# Patient Record
Sex: MALE | Race: WHITE | NOT HISPANIC OR LATINO | Employment: FULL TIME | URBAN - NONMETROPOLITAN AREA
[De-identification: names, ages, dates, MRNs, and addresses within clinical notes are randomized per-mention and may not be internally consistent; named-entity substitution may affect disease eponyms.]

---

## 2018-08-11 ENCOUNTER — APPOINTMENT (OUTPATIENT)
Dept: CT IMAGING | Facility: HOSPITAL | Age: 33
End: 2018-08-11

## 2018-08-11 ENCOUNTER — HOSPITAL ENCOUNTER (EMERGENCY)
Facility: HOSPITAL | Age: 33
Discharge: HOME | End: 2018-08-11
Attending: EMERGENCY MEDICINE | Admitting: EMERGENCY MEDICINE

## 2018-08-11 ENCOUNTER — APPOINTMENT (OUTPATIENT)
Dept: GENERAL RADIOLOGY | Facility: HOSPITAL | Age: 33
End: 2018-08-11

## 2018-08-11 VITALS
HEART RATE: 74 BPM | RESPIRATION RATE: 18 BRPM | OXYGEN SATURATION: 98 % | DIASTOLIC BLOOD PRESSURE: 85 MMHG | WEIGHT: 170 LBS | TEMPERATURE: 98 F | HEIGHT: 64 IN | SYSTOLIC BLOOD PRESSURE: 131 MMHG | BODY MASS INDEX: 29.02 KG/M2

## 2018-08-11 DIAGNOSIS — S40.011A CONTUSION OF RIGHT SHOULDER, INITIAL ENCOUNTER: Primary | ICD-10-CM

## 2018-08-11 PROCEDURE — 70450 CT HEAD/BRAIN W/O DYE: CPT

## 2018-08-11 PROCEDURE — 99283 EMERGENCY DEPT VISIT LOW MDM: CPT

## 2018-08-11 PROCEDURE — 73030 X-RAY EXAM OF SHOULDER: CPT

## 2018-08-11 RX ORDER — IBUPROFEN 800 MG/1
800 TABLET ORAL EVERY 6 HOURS PRN
Qty: 20 TABLET | Refills: 0 | Status: SHIPPED | OUTPATIENT
Start: 2018-08-11 | End: 2018-08-13 | Stop reason: SDUPTHER

## 2018-08-11 RX ORDER — OXYCODONE HYDROCHLORIDE AND ACETAMINOPHEN 5; 325 MG/1; MG/1
1 TABLET ORAL EVERY 6 HOURS PRN
Qty: 15 TABLET | Refills: 0 | Status: SHIPPED | OUTPATIENT
Start: 2018-08-11 | End: 2019-03-15

## 2018-08-11 RX ORDER — OXYCODONE HYDROCHLORIDE AND ACETAMINOPHEN 5; 325 MG/1; MG/1
2 TABLET ORAL ONCE
Status: COMPLETED | OUTPATIENT
Start: 2018-08-11 | End: 2018-08-11

## 2018-08-11 RX ADMIN — OXYCODONE HYDROCHLORIDE AND ACETAMINOPHEN 2 TABLET: 5; 325 TABLET ORAL at 02:30

## 2018-08-11 NOTE — ED PROVIDER NOTES
Subjective   33-year-old male presents emergency Department with a right shoulder injury.  Patient was thrown from a horse about 6 hours ago.  He is riding horses 3-year-old son and both were knocked to the ground.  He landed on his right shoulder.  He did not hit his head and there was no loss of consciousness.  He said it felt like there was grinding in the shoulder.  Says the pain is in the upper posterior aspect of the shoulder.        Upper Extremity Issue   Location:  Shoulder  Shoulder location:  R shoulder  Injury: yes    Time since incident:  6 hours  Mechanism of injury: fall    Fall:     Fall occurred: off of a horse.    Impact surface:  Grass    Point of impact: R shoulder.  Pain details:     Quality:  Aching    Radiates to:  Does not radiate    Severity:  Severe    Onset quality:  Sudden    Timing:  Constant    Progression:  Worsening  Relieved by:  Nothing  Worsened by:  Movement  Ineffective treatments:  None tried  Associated symptoms: no back pain, no fatigue and no fever        Review of Systems   Constitutional: Negative for chills, fatigue and fever.   HENT: Negative for congestion and sore throat.    Eyes: Negative for visual disturbance.   Respiratory: Negative for cough and shortness of breath.    Cardiovascular: Negative for chest pain and leg swelling.   Gastrointestinal: Negative for abdominal pain, nausea and vomiting.   Genitourinary: Negative for dysuria.   Musculoskeletal: Negative for back pain.   Skin: Negative for rash.   Neurological: Negative for dizziness and weakness.   Psychiatric/Behavioral: Negative for behavioral problems.   All other systems reviewed and are negative.      Past Medical History:   Diagnosis Date   • Depression        No Known Allergies    History reviewed. No pertinent surgical history.    Family History   Problem Relation Age of Onset   • No Known Problems Mother    • Hypertension Father        Social History     Social History   • Marital status: Single      Social History Main Topics   • Smoking status: Never Smoker   • Smokeless tobacco: Never Used   • Alcohol use Yes      Comment: on weekends    • Drug use: No     Other Topics Concern   • Not on file           Objective   Physical Exam   Constitutional: He is oriented to person, place, and time. He appears well-developed and well-nourished.   HENT:   Head: Normocephalic and atraumatic.   Eyes: Pupils are equal, round, and reactive to light. EOM are normal.   Neck: Normal range of motion. Neck supple.   No midline tenderness   Cardiovascular: Normal rate, regular rhythm, normal heart sounds and intact distal pulses.  Exam reveals no gallop and no friction rub.    No murmur heard.  Pulmonary/Chest: Breath sounds normal. No respiratory distress. He has no wheezes. He has no rales.   No rhonchi   Abdominal: Soft. Bowel sounds are normal. He exhibits no distension. There is no tenderness.   Musculoskeletal: He exhibits tenderness. He exhibits no deformity.   There is marked tenderness to the shoulder without obvious deformity.  Ears good neurovascular function in the right upper extremity.   Neurological: He is alert and oriented to person, place, and time. No cranial nerve deficit. He exhibits normal muscle tone. Coordination normal.   Skin: Skin is warm and dry. No rash noted.   Psychiatric: He has a normal mood and affect. His behavior is normal.       Procedures           ED Course                  MDM      Final diagnoses:   Contusion of right shoulder, initial encounter            Percy Christine MD  08/11/18 [id number]

## 2018-08-13 ENCOUNTER — OFFICE VISIT (OUTPATIENT)
Dept: FAMILY MEDICINE CLINIC | Facility: CLINIC | Age: 33
End: 2018-08-13

## 2018-08-13 VITALS
WEIGHT: 174 LBS | OXYGEN SATURATION: 99 % | TEMPERATURE: 99.3 F | HEART RATE: 87 BPM | RESPIRATION RATE: 18 BRPM | HEIGHT: 64 IN | SYSTOLIC BLOOD PRESSURE: 110 MMHG | DIASTOLIC BLOOD PRESSURE: 60 MMHG | BODY MASS INDEX: 29.71 KG/M2

## 2018-08-13 DIAGNOSIS — S49.91XD INJURY OF RIGHT SHOULDER, SUBSEQUENT ENCOUNTER: ICD-10-CM

## 2018-08-13 DIAGNOSIS — F32.A DEPRESSION, UNSPECIFIED DEPRESSION TYPE: ICD-10-CM
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
## 2018-08-13 DIAGNOSIS — R11.0 NAUSEA: ICD-10-CM

## 2018-08-13 DIAGNOSIS — F41.9 ANXIETY: ICD-10-CM

## 2018-08-13 DIAGNOSIS — M25.561 ACUTE PAIN OF RIGHT KNEE: ICD-10-CM

## 2018-08-13 DIAGNOSIS — M25.511 ACUTE PAIN OF RIGHT SHOULDER: ICD-10-CM

## 2018-08-13 DIAGNOSIS — S89.91XD INJURY OF RIGHT KNEE, SUBSEQUENT ENCOUNTER: ICD-10-CM

## 2018-08-13 PROCEDURE — 96372 THER/PROPH/DIAG INJ SC/IM: CPT | Performed by: NURSE PRACTITIONER

## 2018-08-13 PROCEDURE — 99214 OFFICE O/P EST MOD 30 MIN: CPT | Performed by: NURSE PRACTITIONER

## 2018-08-13 RX ORDER — AMITRIPTYLINE HYDROCHLORIDE 25 MG/1
25 TABLET, FILM COATED ORAL NIGHTLY
Qty: 30 TABLET | Refills: 0 | Status: SHIPPED | OUTPATIENT
Start: 2018-08-13 | End: 2018-08-22

## 2018-08-13 RX ORDER — TRIAMCINOLONE ACETONIDE 40 MG/ML
40 INJECTION, SUSPENSION INTRA-ARTICULAR; INTRAMUSCULAR ONCE
Status: COMPLETED | OUTPATIENT
Start: 2018-08-13 | End: 2018-08-13

## 2018-08-13 RX ORDER — SERTRALINE HYDROCHLORIDE 100 MG/1
100 TABLET, FILM COATED ORAL DAILY
Qty: 30 TABLET | Refills: 6 | Status: SHIPPED | OUTPATIENT
Start: 2018-08-13 | End: 2019-03-15

## 2018-08-13 RX ORDER — TIZANIDINE 4 MG/1
4 TABLET ORAL 3 TIMES DAILY PRN
Qty: 90 TABLET | Refills: 0 | Status: SHIPPED | OUTPATIENT
Start: 2018-08-13 | End: 2018-08-22 | Stop reason: SDUPTHER

## 2018-08-13 RX ORDER — KETOROLAC TROMETHAMINE 30 MG/ML
60 INJECTION, SOLUTION INTRAMUSCULAR; INTRAVENOUS ONCE
Status: COMPLETED | OUTPATIENT
Start: 2018-08-13 | End: 2018-08-13

## 2018-08-13 RX ORDER — IBUPROFEN 800 MG/1
800 TABLET ORAL EVERY 8 HOURS PRN
Qty: 90 TABLET | Refills: 0 | Status: SHIPPED | OUTPATIENT
Start: 2018-08-13 | End: 2018-08-22 | Stop reason: SDUPTHER

## 2018-08-13 RX ADMIN — TRIAMCINOLONE ACETONIDE 40 MG: 40 INJECTION, SUSPENSION INTRA-ARTICULAR; INTRAMUSCULAR at 16:32

## 2018-08-13 RX ADMIN — KETOROLAC TROMETHAMINE 60 MG: 30 INJECTION, SOLUTION INTRAMUSCULAR; INTRAVENOUS at 16:31

## 2018-08-14 RX ORDER — PROMETHAZINE HYDROCHLORIDE 12.5 MG/1
12.5 TABLET ORAL EVERY 6 HOURS PRN
Qty: 30 TABLET | Refills: 0 | Status: SHIPPED | OUTPATIENT
Start: 2018-08-14 | End: 2018-08-22 | Stop reason: SDUPTHER

## 2018-08-14 NOTE — PROGRESS NOTES
Subjective   Ba Snow is a 33 y.o. male who presents to the office for right shoulder injury/pain secondary to falling off a horse.    History of Present Illness     Patient states incident occurred on 8/10/18.  However, his son was involved, so they took him to [place] and patient waited to go to the doctor still afterwards.  Patient's MRI and worse when they were thrown off, patient states that he was holding son in his right arm and landed on top of his right arm/shoulder and right side, is unsure pointed impact besides his right arm last shoulder and right side, but did have some residual pain the next day in his right knee as well as significant pain in his right arm/shoulder , and 2 hours after incident.  Patient did go to the ER at [facility] in [place] on 8/11/18.  Early in the a.m.  He states initially he had a grinding of the right shoulder and a couple hours later significant pain, states swelling and bruising has gone down, but pain has not.  It is remaining the same.  Patient unable to move from the right elbow up to the right shoulder without significant pain.  Patient states that he was put off work until today by the ER.  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    Reviewed ER report through [facility] in [place] on 8/11/18: Patient had CT of head without contrast and x-ray of right shoulder, both of which were negative.  Patient was prescribed Percocet and ibuprofen due to told to follow-up with PCP.  Patient also given sling for arm.    Anxiety/depression-chronic, controlled with Zoloft.  -Patient has been office due to not finding provider for refills, DT did well on it, agreeable to going back on it.    The following portions of the patient's history were reviewed and updated as appropriate: allergies, current medications, past family history, past medical history, past social history, past surgical history and problem list.    Review of Systems  "  Constitutional: Positive for activity change (cannot use right arm at all). Negative for appetite change, chills, diaphoresis, fatigue, fever and unexpected weight change.   HENT: Negative for congestion, ear discharge, ear pain, hearing loss, postnasal drip, rhinorrhea, sinus pain, sinus pressure, sneezing, sore throat, tinnitus and trouble swallowing.    Eyes: Negative.    Respiratory: Negative for cough, chest tightness, shortness of breath and wheezing.    Cardiovascular: Negative for chest pain, palpitations and leg swelling.   Gastrointestinal: Positive for nausea (only with excruiating pain). Negative for abdominal distention, abdominal pain, blood in stool, constipation, diarrhea and vomiting.   Genitourinary: Negative for decreased urine volume, discharge, dysuria, flank pain, frequency, hematuria, penile pain, penile swelling, scrotal swelling, testicular pain and urgency.   Musculoskeletal: Positive for arthralgias (right shoulder, right knee).   Neurological: Negative for dizziness, tremors, seizures, syncope, weakness, light-headedness, numbness and headaches.   Hematological: Does not bruise/bleed easily.   Psychiatric/Behavioral: Negative for agitation, behavioral problems, decreased concentration, self-injury, sleep disturbance and suicidal ideas. The patient is not nervous/anxious.        Past Medical History:   Diagnosis Date   • Depression        Family History   Problem Relation Age of Onset   • No Known Problems Mother    • Hypertension Father           Objective   /60   Pulse 87   Temp 99.3 °F (37.4 °C) (Temporal Artery )   Resp 18   Ht 162.6 cm (64\")   Wt 78.9 kg (174 lb)   SpO2 99%   BMI 29.87 kg/m²   Physical Exam   Constitutional: He appears well-developed and well-nourished. No distress.   Cardiovascular: Normal rate, regular rhythm, normal heart sounds and intact distal pulses.  Exam reveals no gallop and no friction rub.    No murmur heard.  Pulmonary/Chest: Effort normal " and breath sounds normal. No respiratory distress. He has no wheezes. He has no rales.   Musculoskeletal:        Right shoulder: He exhibits decreased range of motion (extremely limited ROM of shoulder and elbow), tenderness (anterior grove of inner anterior aspect of shoulder), pain and decreased strength (patient unable to lift weight of right arm). He exhibits no bony tenderness, no swelling, no deformity, no spasm and normal pulse.        Right knee: He exhibits normal range of motion and no swelling. Tenderness found. Patellar tendon tenderness noted. No medial joint line, no lateral joint line, no MCL and no LCL tenderness noted.   Skin: Skin is warm and dry. Capillary refill takes less than 2 seconds. He is not diaphoretic. No pallor.        Psychiatric: He has a normal mood and affect. His behavior is normal.   Nursing note and vitals reviewed.       PHQ-2/PHQ-9 Depression Screening 8/13/2018   Little interest or pleasure in doing things 0   Feeling down, depressed, or hopeless 0   Total Score 0         Assessment/Plan   Ba was seen today for follow-up.    Diagnoses and all orders for this visit:    Fall from horse, subsequent encounter    Anxiety  -     sertraline (ZOLOFT) 100 MG tablet; Take 1 tablet by mouth Daily.    Depression, unspecified depression type  -     sertraline (ZOLOFT) 100 MG tablet; Take 1 tablet by mouth Daily.    Acute pain of right shoulder  -     ibuprofen (ADVIL,MOTRIN) 800 MG tablet; Take 1 tablet by mouth Every 8 (Eight) Hours As Needed for Mild Pain .  -     tiZANidine (ZANAFLEX) 4 MG tablet; Take 1 tablet by mouth 3 (Three) Times a Day As Needed for Muscle Spasms.  -     amitriptyline (ELAVIL) 25 MG tablet; Take 1 tablet by mouth Every Night.  -     ketorolac (TORADOL) injection 60 mg; Inject 60 mg into the appropriate muscle as directed by prescriber 1 (One) Time.  -     triamcinolone acetonide (KENALOG-40) injection 40 mg; Inject 1 mL into the appropriate muscle as  directed by prescriber 1 (One) Time.  -     MRI Shoulder Right Without Contrast    Injury of right shoulder, subsequent encounter  -     MRI Shoulder Right Without Contrast    Injury of right knee, subsequent encounter    Acute pain of right knee    Nausea    Other orders  -     promethazine (PHENERGAN) 12.5 MG tablet; Take 1 tablet by mouth Every 6 (Six) Hours As Needed for Nausea or Vomiting.           Patient having right knee pain and right shoulder pain secondary to be thrown off a horse on 8/10/18. ER report through [facility] in [place] on 8/11/18 reviewed above. Patient had CT of head without contrast and x-ray of right shoulder, both of which were negative.  Physical exam showed that patient is unable to move elbow or shoulder without significant pain, unable to fully hold up arm with musculature of right shoulder, and needs support to fully hold up right arm.  I am concerned about partial is not full-thickness tear of the anterior shoulder muscle.  Will order MRI of right shoulder without contrast to rule in or out.  Patient declines offer for x-ray of right knee as he states this is getting better and not bothering him that much.  Can continue to alternate Percocets until they are out with ibuprofen, refilled ibuprofen, prescribed Zanaflex when necessary, added amitriptyline at bedtime.  Follow-up next week.  Off work until follow-up as patient work, does not allow him to work with restrictions. Provided new sling as sling provided previously covered in vomit from son and pt not currently using one. Advised pt to wear continuously to help keep from worsening condition of shoulder before MRI completed with ROM of as tolerated. Phenergan for nausea with pain gets really bad.     Anxiety/depression-chronic, controlled with Zoloft.  -Refilled Zoloft, follow-up in 6 months    Patient educated to follow-up sooner than next scheduled appointment if condition(s) worse or do not improve. Patient states  understanding and is in agreeance with plan of care. An After Visit Summary was printed and given to the patient.      VIJAYA Tenorio        This document has been electronically signed by VIJAYA Tenorio on August 14, 2018 8:36 AM      EMR/Transcription Dragon Disclaimer:  Some of this note may be an electronic dragon transcription/translation of spoken language to printed text. The electronic translation of spoken language may permit erroneous, or at times, nonsensical words or phrases to be inadvertently transcribed. Although I have reviewed the note for such errors, some may still exist.

## 2018-08-17 ENCOUNTER — TELEPHONE (OUTPATIENT)
Dept: FAMILY MEDICINE CLINIC | Facility: CLINIC | Age: 33
End: 2018-08-17

## 2018-08-17 DIAGNOSIS — R93.6 ABNORMAL MRI, SHOULDER: Primary | ICD-10-CM

## 2018-08-17 NOTE — TELEPHONE ENCOUNTER
----- Message from VIJAYA Tenorio sent at 8/16/2018  7:53 PM CDT -----  Let patient know MRI resulted, showing surprisingly no muscle tear but he fracture of big ball of shoulder bone (greater tuberosity) that is mildly displaced.     Some Osteoarthritis changes of joint probably just from overuse.     Some tendinosis of two areas. Tendinosis is damage to a tendon at a cellular level. It is thought to be caused by microtears in the connective tissue in and around the tendon, leading to an increase in tendon repair cells. This may lead to reduced tensile strength, thus increasing the chance of tendon rupture.     And inflammation of one of the brusa sacs of the joint.    Plan for this: DO NOT LIFT ANYTHING MORE THAN FIVE lbs with that arm. And we need to get you into ortho asap for estimation of recovery time and if surgery is needed. Please schedule stat appt with ortho, tell pt to  disc for appt. Let me knwo when appt is thanks :)

## 2018-08-17 NOTE — TELEPHONE ENCOUNTER
----- Message from Betsy Jordan sent at 8/17/2018  2:13 PM CDT -----  Appointment with Dr Denise on 8/20/18 @10:15. He will need to get MRI on a disk for that appointment. I spoke with him in regards to the results and- Michael his girlfriend with the appointment time and instruction on getting the disk.  ----- Message -----  From: Kelsey Sanchez APRN  Sent: 8/16/2018   7:53 PM  To: Betsy Jordan    Let patient know MRI resulted, showing surprisingly no muscle tear but he fracture of big ball of shoulder bone (greater tuberosity) that is mildly displaced.     Some Osteoarthritis changes of joint probably just from overuse.     Some tendinosis of two areas. Tendinosis is damage to a tendon at a cellular level. It is thought to be caused by microtears in the connective tissue in and around the tendon, leading to an increase in tendon repair cells. This may lead to reduced tensile strength, thus increasing the chance of tendon rupture.     And inflammation of one of the brusa sacs of the joint.    Plan for this: DO NOT LIFT ANYTHING MORE THAN FIVE lbs with that arm. And we need to get you into ortho asap for estimation of recovery time and if surgery is needed. Please schedule stat appt with ortho, tell pt to  disc for appt. Let me knwo when appt is thanks :)

## 2018-08-20 ENCOUNTER — OFFICE VISIT (OUTPATIENT)
Dept: ORTHOPEDIC SURGERY | Facility: CLINIC | Age: 33
End: 2018-08-20

## 2018-08-20 VITALS — HEIGHT: 64 IN | BODY MASS INDEX: 29.71 KG/M2 | WEIGHT: 174 LBS

## 2018-08-20 DIAGNOSIS — M25.511 ACUTE PAIN OF RIGHT SHOULDER: Primary | ICD-10-CM

## 2018-08-20 DIAGNOSIS — S42.254A NONDISPLACED FRACTURE OF GREATER TUBEROSITY OF RIGHT HUMERUS, INITIAL ENCOUNTER FOR CLOSED FRACTURE: ICD-10-CM

## 2018-08-20 PROCEDURE — 99203 OFFICE O/P NEW LOW 30 MIN: CPT | Performed by: ORTHOPAEDIC SURGERY

## 2018-08-20 PROCEDURE — 23620 CLTX GR HMRL TBRS FX WO MNPJ: CPT | Performed by: ORTHOPAEDIC SURGERY

## 2018-08-20 NOTE — PROGRESS NOTES
Ba Snow is a 33 y.o. male   Primary provider:  Provider, No Known       Chief Complaint   Patient presents with   • Right Shoulder - Pain       HISTORY OF PRESENT ILLNESS: Patient is here today for right shoulder pain. Patient has been seen in the ER and by VIJAYA Hsu for his right shoulder.  He was thrown off a horse and injured his right shoulder.  X-rays did show that had MRI scan which shows a nondisplaced fracture of the greater tuberosity.  No history of prior injury.  Associated with pain but no neurologic symptoms are noted.    History of Present Illness     CONCURRENT MEDICAL HISTORY:    Past Medical History:   Diagnosis Date   • Depression        No Known Allergies      Current Outpatient Prescriptions:   •  ibuprofen (ADVIL,MOTRIN) 800 MG tablet, Take 1 tablet by mouth Every 8 (Eight) Hours As Needed for Mild Pain ., Disp: 90 tablet, Rfl: 0  •  promethazine (PHENERGAN) 12.5 MG tablet, Take 1 tablet by mouth Every 6 (Six) Hours As Needed for Nausea or Vomiting., Disp: 30 tablet, Rfl: 0  •  sertraline (ZOLOFT) 100 MG tablet, Take 1 tablet by mouth Daily., Disp: 30 tablet, Rfl: 6  •  tiZANidine (ZANAFLEX) 4 MG tablet, Take 1 tablet by mouth 3 (Three) Times a Day As Needed for Muscle Spasms., Disp: 90 tablet, Rfl: 0  •  amitriptyline (ELAVIL) 25 MG tablet, Take 1 tablet by mouth Every Night., Disp: 30 tablet, Rfl: 0  •  oxyCODONE-acetaminophen (PERCOCET) 5-325 MG per tablet, Take 1 tablet by mouth Every 6 (Six) Hours As Needed for Severe Pain ., Disp: 15 tablet, Rfl: 0    No past surgical history on file.    Family History   Problem Relation Age of Onset   • No Known Problems Mother    • Hypertension Father         Social History     Social History   • Marital status: Single     Spouse name: N/A   • Number of children: N/A   • Years of education: N/A     Occupational History   • Not on file.     Social History Main Topics   • Smoking status: Never Smoker   • Smokeless tobacco: Never Used  "  • Alcohol use Yes      Comment: on weekends    • Drug use: No   • Sexual activity: Not on file     Other Topics Concern   • Not on file     Social History Narrative   • No narrative on file        Review of Systems   Constitutional: Positive for activity change. Negative for chills and fever.   HENT: Negative for facial swelling.    Eyes: Negative for photophobia.   Respiratory: Negative for apnea and shortness of breath.    Cardiovascular: Negative for chest pain and leg swelling.   Gastrointestinal: Negative for abdominal pain, nausea and vomiting.   Genitourinary: Negative for dysuria.   Musculoskeletal: Positive for arthralgias and joint swelling.   Skin: Negative for color change and rash.   Neurological: Negative for seizures and syncope.   Psychiatric/Behavioral: Negative for behavioral problems and dysphoric mood.       PHYSICAL EXAMINATION:       Ht 162.6 cm (64\")   Wt 78.9 kg (174 lb)   BMI 29.87 kg/m²     Physical Exam   Constitutional: He is oriented to person, place, and time. He appears well-developed and well-nourished.   HENT:   Head: Normocephalic and atraumatic.   Eyes: Pupils are equal, round, and reactive to light. EOM are normal.   Neck: Neck supple. No tracheal deviation present.   Pulmonary/Chest: Effort normal.   Musculoskeletal: Normal range of motion. He exhibits no edema or deformity.   Neurological: He is alert and oriented to person, place, and time.   Skin: Skin is warm and dry. No erythema.   Psychiatric: He has a normal mood and affect. Thought content normal.   Vitals reviewed.      GAIT:     [x]  Normal  []  Antalgic    Assistive device: [x]  None  []  Walker     []  Crutches  []  Cane     []  Wheelchair  []  Stretcher    Ortho Exam   Pain and tenderness pain with any root type of abduction passive motions intact he's neurologically intact.  Over the anterior shoulder.    Xr Shoulder 2+ View Right    Result Date: 8/11/2018  Narrative: Exam: Right shoulder three views INDICATION: " Pain FINDINGS: Three views. No acute fracture or dislocation. Joint spaces are maintained. No lytic or destructive lesion     Impression: No acute bony abnormality. Electronically signed by:  Ronn Ross MD  8/11/2018 2:30 AM CDT Workstation: [id number]    Ct Head Without Contrast    Result Date: 8/11/2018  Narrative: Exam: Head CT without contrast INDICATION: Trauma TECHNIQUE: Routine head CT without contrast. Sagittal coronal reconstructions were obtained. This exam was performed according to our departmental dose-optimization program, which includes automated exposure control, adjustment of the mA and/or kV according to patient size and/or use of iterative reconstruction technique. COMPARISON: 8/5/2012 FINDINGS: The bony calvarium is intact. Mild mucosal thickening is present in the ethmoid sinuses. Mastoid air cells are clear. No extra-axial fluid collection. The ventricular system is normal. Normal gray-white differentiation. No obvious sign of acute infarction. No intraparenchymal hemorrhage, mass or midline shift.     Impression: No acute intracranial abnormality. Electronically signed by:  Ronn Ross MD  8/11/2018 3:04 AM CDT Workstation: [id number]    X-ray shows nondisplaced greater tuberosity fracture.      ASSESSMENT:    Diagnoses and all orders for this visit:    Acute pain of right shoulder  -     Cancel: XR Shoulder 2+ View Right  -     XR shoulder 1 vw right    Nondisplaced fracture of greater tuberosity of right humerus, initial encounter for closed fracture          PLAN I've gone over an x-ray today and external rotation x-ray week actually see the fracture.  His previous x-rays he had only 2 interrupted rotators raise.  I've gone over this with him will do pendulum type exercises which I have instructed him.  He'll use a sling for comfort.  I told him to avoid active motion will see him in 3 weeks repeat an x-ray on arrival.  I suspect this will heal spontaneously.  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    No Follow-up on file.    Kush Lou MD

## 2018-08-22 ENCOUNTER — OFFICE VISIT (OUTPATIENT)
Dept: FAMILY MEDICINE CLINIC | Facility: CLINIC | Age: 33
End: 2018-08-22

## 2018-08-22 VITALS
OXYGEN SATURATION: 98 % | WEIGHT: 174 LBS | BODY MASS INDEX: 29.71 KG/M2 | HEIGHT: 64 IN | SYSTOLIC BLOOD PRESSURE: 114 MMHG | DIASTOLIC BLOOD PRESSURE: 74 MMHG | RESPIRATION RATE: 16 BRPM | HEART RATE: 91 BPM | TEMPERATURE: 99.2 F

## 2018-08-22 DIAGNOSIS — R07.89 RIGHT-SIDED CHEST WALL PAIN: ICD-10-CM

## 2018-08-22 DIAGNOSIS — S42.254A NONDISPLACED FRACTURE OF GREATER TUBEROSITY OF RIGHT HUMERUS, INITIAL ENCOUNTER FOR CLOSED FRACTURE: Primary | ICD-10-CM
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
## 2018-08-22 DIAGNOSIS — G47.00 INSOMNIA, UNSPECIFIED TYPE: ICD-10-CM

## 2018-08-22 DIAGNOSIS — M25.511 ACUTE PAIN OF RIGHT SHOULDER: ICD-10-CM

## 2018-08-22 DIAGNOSIS — M25.561 ACUTE PAIN OF RIGHT KNEE: ICD-10-CM

## 2018-08-22 PROCEDURE — 99214 OFFICE O/P EST MOD 30 MIN: CPT | Performed by: NURSE PRACTITIONER

## 2018-08-22 RX ORDER — TIZANIDINE 4 MG/1
4 TABLET ORAL 3 TIMES DAILY PRN
Qty: 90 TABLET | Refills: 2 | Status: SHIPPED | OUTPATIENT
Start: 2018-08-22 | End: 2019-03-15

## 2018-08-22 RX ORDER — PROMETHAZINE HYDROCHLORIDE 12.5 MG/1
12.5 TABLET ORAL EVERY 6 HOURS PRN
Qty: 30 TABLET | Refills: 2 | Status: SHIPPED | OUTPATIENT
Start: 2018-08-22 | End: 2019-03-28

## 2018-08-22 RX ORDER — IBUPROFEN 800 MG/1
800 TABLET ORAL EVERY 8 HOURS PRN
Qty: 90 TABLET | Refills: 2 | Status: SHIPPED | OUTPATIENT
Start: 2018-08-22 | End: 2019-03-28

## 2018-08-22 RX ORDER — QUETIAPINE FUMARATE 50 MG/1
TABLET, FILM COATED ORAL
Qty: 60 TABLET | Refills: 0 | Status: SHIPPED | OUTPATIENT
Start: 2018-08-22 | End: 2018-09-12 | Stop reason: SDUPTHER

## 2018-09-05 NOTE — PROGRESS NOTES
Subjective   Ba Snow is a 33 y.o. male who presents to the office for right shoulder injury/pain secondary to falling off a horse.    History of Present Illness     On 8/10/18, patient was thrown from a horse, along with his son.  Point of impact was right shoulder and right side.  Patient complaint of right shoulder/arm pain, right sided pain, and right knee pain.  Due to his son injuries being more concerning he did not go to the ER until 8/11/18, ER report per [facility] on [place] is summarized during office visit on 8/13/18.  See office visit 8/13/18 for full report on incident.    8/11/18: CT head without contrast: Impression: Negative  8/11/18: X-ray of right shoulder: Impression: Negative  8/16/18: MRI of right shoulder: Acute to subacute appearing mildly displaced and slightly communited fracture of greater tuberosity, mild infraspinatus and supraspinatus tendinosis, mild glenohumeral joint primary osteoarthritic changes, mild subacromial subdeltoid bursitis.    After reviewing MRI with patient via phone we set up a referral and appointment with Dr. Lou orthopedic specialist.  Patient saw Dr. Lou on 8/20/18, reviewed that MRI of right shoulder showed nondisplaced fracture of greater tuberosity.  Plan of care included pendulum type exercises, sling for comfort, avoid active motion, will see back in 3 weeks to repeat x-ray, will heal spontaneously, off work for 6 weeks.    Today 8/22/18, patient states still having a decent amount of pain of right shoulder, states it is worse at nighttime.  Patient is using sling and doing exercises as ordered by orthopedic specialist.  Patient has [facility] paperwork to fill out for an estimated 6 weeks off at work.  Pain of right side and right knee are improving but not completely resolved.    Patient states that anxiety and depression are well controlled with Zoloft that he is having trouble sleeping, even if pain is controlled.  Would  like to try Seroquel for sleep.  Advised patient that this does not work like amitriptyline is and it will not help with pain only sleep, patient understands would still like to try it.    PMH:  Anxiety/depression-chronic, controlled with Zoloft.    The following portions of the patient's history were reviewed and updated as appropriate: allergies, current medications, past family history, past medical history, past social history, past surgical history and problem list.    Review of Systems   Constitutional: Positive for activity change (cannot use right arm at all). Negative for appetite change, chills, diaphoresis, fatigue, fever and unexpected weight change.   HENT: Negative for congestion, ear discharge, ear pain, hearing loss, postnasal drip, rhinorrhea, sinus pain, sinus pressure, sneezing, sore throat, tinnitus and trouble swallowing.    Eyes: Negative.    Respiratory: Negative for cough, chest tightness, shortness of breath and wheezing.    Cardiovascular: Negative for chest pain, palpitations and leg swelling.   Gastrointestinal: Positive for nausea (only with excruiating pain). Negative for abdominal distention, abdominal pain, blood in stool, constipation, diarrhea and vomiting.   Genitourinary: Negative for decreased urine volume, discharge, dysuria, flank pain, frequency, hematuria, penile pain, penile swelling, scrotal swelling, testicular pain and urgency.   Musculoskeletal: Positive for arthralgias (right shoulder, right knee).   Neurological: Negative for dizziness, tremors, seizures, syncope, weakness, light-headedness, numbness and headaches.   Hematological: Does not bruise/bleed easily.   Psychiatric/Behavioral: Negative for agitation, behavioral problems, decreased concentration, self-injury, sleep disturbance and suicidal ideas. The patient is not nervous/anxious.        Past Medical History:   Diagnosis Date   • Depression        Family History   Problem Relation Age of Onset   • No Known  "Problems Mother    • Hypertension Father           Objective   /74   Pulse 91   Temp 99.2 °F (37.3 °C) (Temporal Artery )   Resp 16   Ht 162.6 cm (64\")   Wt 78.9 kg (174 lb)   SpO2 98%   BMI 29.87 kg/m²   Physical Exam   Constitutional: He appears well-developed and well-nourished. No distress.   Cardiovascular: Normal rate, regular rhythm, normal heart sounds and intact distal pulses.  Exam reveals no gallop and no friction rub.    No murmur heard.  Pulmonary/Chest: Effort normal and breath sounds normal. No respiratory distress. He has no wheezes. He has no rales.   Musculoskeletal:        Right shoulder: He exhibits decreased range of motion (extremely limited ROM of shoulder and elbow), tenderness (anterior grove of inner anterior aspect of shoulder), pain and decreased strength (patient unable to lift weight of right arm). He exhibits no bony tenderness, no swelling, no deformity, no spasm and normal pulse.        Right knee: He exhibits normal range of motion and no swelling. Tenderness found. Patellar tendon tenderness noted. No medial joint line, no lateral joint line, no MCL and no LCL tenderness noted.   Skin: Skin is warm and dry. Capillary refill takes less than 2 seconds. He is not diaphoretic. No pallor.        Psychiatric: He has a normal mood and affect. His behavior is normal.   Nursing note and vitals reviewed.       PHQ-2/PHQ-9 Depression Screening 8/22/2018   Little interest or pleasure in doing things 0   Feeling down, depressed, or hopeless 0   Total Score 0         Assessment/Plan   Ba was seen today for follow-up.    Diagnoses and all orders for this visit:    Nondisplaced fracture of greater tuberosity of right humerus, initial encounter for closed fracture    Insomnia, unspecified type  -     QUEtiapine (SEROQUEL) 50 MG tablet; Take 1-2 tablets at bedtime for sleep.    Acute pain of right shoulder  -     ibuprofen (ADVIL,MOTRIN) 800 MG tablet; Take 1 tablet by mouth Every " "8 (Eight) Hours As Needed for Mild Pain .  -     tiZANidine (ZANAFLEX) 4 MG tablet; Take 1 tablet by mouth 3 (Three) Times a Day As Needed for Muscle Spasms.    Fall from horse, sequela    Right-sided chest wall pain    Acute pain of right knee    Other orders  -     promethazine (PHENERGAN) 12.5 MG tablet; Take 1 tablet by mouth Every 6 (Six) Hours As Needed for Nausea or Vomiting.            Insomnia-new problem, trying they're \"left bedtime.  Will follow-up in a couple weeks,.    Patient having right knee pain, right side, and right shoulder pain secondary to be thrown off a horse on 8/10/18.  Nausea secondary to pain.  MRI of right shoulder showed nondisplaced fracture of greater tuberosity of right humerus.  Patient is being monitored by orthopedic specialist Dr. Hammond who estimates spontaneous healing over the next 6 weeks.  [facility] paperwork filled out.  Refilled Zanaflex and ibuprofen and promethazine for nausea.  Follow-up in a couple weeks after seeing ortho.    Patient educated to follow-up sooner than next scheduled appointment if condition(s) worse or do not improve. Patient states understanding and is in agreeance with plan of care. An After Visit Summary was printed and given to the patient.      VIJAYA Tenorio        This document has been electronically signed by VIJAYA Tenorio on September 5, 2018 12:59 PM      EMR/Transcription Dragon Disclaimer:  Some of this note may be an electronic dragon transcription/translation of spoken language to printed text. The electronic translation of spoken language may permit erroneous, or at times, nonsensical words or phrases to be inadvertently transcribed. Although I have reviewed the note for such errors, some may still exist.   "

## 2018-09-07 DIAGNOSIS — S42.254A NONDISPLACED FRACTURE OF GREATER TUBEROSITY OF RIGHT HUMERUS, INITIAL ENCOUNTER FOR CLOSED FRACTURE: Primary | ICD-10-CM

## 2018-09-10 ENCOUNTER — OFFICE VISIT (OUTPATIENT)
Dept: ORTHOPEDIC SURGERY | Facility: CLINIC | Age: 33
End: 2018-09-10

## 2018-09-10 VITALS — WEIGHT: 174 LBS | BODY MASS INDEX: 29.71 KG/M2 | HEIGHT: 64 IN

## 2018-09-10 DIAGNOSIS — M25.511 ACUTE PAIN OF RIGHT SHOULDER: ICD-10-CM

## 2018-09-10 DIAGNOSIS — S42.254A NONDISPLACED FRACTURE OF GREATER TUBEROSITY OF RIGHT HUMERUS, INITIAL ENCOUNTER FOR CLOSED FRACTURE: Primary | ICD-10-CM

## 2018-09-10 PROCEDURE — 99024 POSTOP FOLLOW-UP VISIT: CPT | Performed by: ORTHOPAEDIC SURGERY

## 2018-09-10 NOTE — PROGRESS NOTES
"Ba Snow is a 33 y.o. male returns for     Chief Complaint   Patient presents with   • Right Shoulder - Follow-up       HISTORY OF PRESENT ILLNESS: Patient is here today for recheck of right humerus fracture. Patient was sent to xray upon arrival.  3 weeks out still using his sling       CONCURRENT MEDICAL HISTORY:    The following portions of the patient's history were reviewed and updated as appropriate: allergies, current medications, past family history, past medical history, past social history, past surgical history and problem list.     ROS  No fevers or chills.  No chest pain or shortness of air.  No GI or  disturbances.    PHYSICAL EXAMINATION:       Ht 162.6 cm (64\")   Wt 78.9 kg (174 lb)   BMI 29.87 kg/m²     Physical Exam    GAIT:     []  Normal  []  Antalgic    Assistive device: []  None  []  Walker     []  Crutches  []  Cane     []  Wheelchair  []  Stretcher    Ortho Exam  Angular motion is intact.  Neurologically intact.    Xr Shoulder 1 Vw Right    Result Date: 8/26/2018  Narrative: One view right shoulder externally rotated Comparison previous scan Hospital Overall bone quality appears intact.  Minimally displaced greater tuberosity fracture.  Glenohumeral joint appears intact. Impression: Greater tuberosity fracture with minimal displacement acute            ASSESSMENT:    Diagnoses and all orders for this visit:    Nondisplaced fracture of greater tuberosity of right humerus, initial encounter for closed fracture  -     Ambulatory Referral to Physical Therapy Evaluate and treat; ROM (Gentle)    Acute pain of right shoulder  -     Ambulatory Referral to Physical Therapy Evaluate and treat; ROM (Gentle)          PLAN physical therapy for gentle active motion.  Follow-up in 3-4 weeks x-raying arrival.    No Follow-up on file.    Kush Lou MD  "

## 2018-09-12 ENCOUNTER — OFFICE VISIT (OUTPATIENT)
Dept: FAMILY MEDICINE CLINIC | Facility: CLINIC | Age: 33
End: 2018-09-12

## 2018-09-12 VITALS
HEART RATE: 92 BPM | WEIGHT: 180 LBS | HEIGHT: 64 IN | SYSTOLIC BLOOD PRESSURE: 110 MMHG | RESPIRATION RATE: 16 BRPM | TEMPERATURE: 99.7 F | DIASTOLIC BLOOD PRESSURE: 70 MMHG | OXYGEN SATURATION: 98 % | BODY MASS INDEX: 30.73 KG/M2

## 2018-09-12 DIAGNOSIS — B07.9 VIRAL WARTS, UNSPECIFIED TYPE: Primary | ICD-10-CM

## 2018-09-12 DIAGNOSIS — G47.00 INSOMNIA, UNSPECIFIED TYPE: ICD-10-CM

## 2018-09-12 DIAGNOSIS — S42.254A NONDISPLACED FRACTURE OF GREATER TUBEROSITY OF RIGHT HUMERUS, INITIAL ENCOUNTER FOR CLOSED FRACTURE: ICD-10-CM

## 2018-09-12 DIAGNOSIS — M25.511 ACUTE PAIN OF RIGHT SHOULDER: ICD-10-CM

## 2018-09-12 PROCEDURE — 99214 OFFICE O/P EST MOD 30 MIN: CPT | Performed by: NURSE PRACTITIONER

## 2018-09-12 PROCEDURE — 17110 DESTRUCTION B9 LES UP TO 14: CPT | Performed by: NURSE PRACTITIONER

## 2018-09-12 RX ORDER — QUETIAPINE FUMARATE 50 MG/1
TABLET, FILM COATED ORAL
Qty: 60 TABLET | Refills: 2 | Status: SHIPPED | OUTPATIENT
Start: 2018-09-12 | End: 2019-03-15

## 2018-09-28 NOTE — PROGRESS NOTES
"Subjective   Ba Snow is a 33 y.o. male who presents to the office for right shoulder injury/pain secondary to falling off a horse, f/u on insomnia, and freezing of warts on hands.     History of Present Illness     On 8/10/18, patient was thrown from a horse, along with his son.  Point of impact was right shoulder and right side.  Patient complaint of right shoulder/arm pain, right sided pain, and right knee pain.  Due to his son injuries being more concerning he did not go to the ER until 8/11/18, ER report per [facility] on [place] is summarized during office visit on 8/13/18.  See office visit 8/13/18 for full report on incident.    Today 9/12/18, pt states still some pain of right shoulder, worse at night time. Pain is controlled moderately with ibuprofen and zanaflex prn. We are now three weeks out from accident.  Pt saw Dr. Gale ortho on 9/10/18 for f/u of right humerus fx, pt had fracture done, baljinder ordered PT to do \"gentle active ROM\" f/u in 3-4 weeks, off work until then.     8/11/18: CT head without contrast: Impression: Negative  8/11/18: X-ray of right shoulder: Impression: Negative  8/16/18: MRI of right shoulder: Acute to subacute appearing mildly displaced and slightly communited fracture of greater tuberosity, mild infraspinatus and supraspinatus tendinosis, mild glenohumeral joint primary osteoarthritic changes, mild subacromial subdeltoid bursitis.  8/26/18 xray of right shoulder per dr gale: minimally displaced greater tuberosity fracture.     Insomnia-chronic, controlled with seroquel 50mg at hs.    Warts-acute  -left hand x 3.   Cryotherapy, Skin Lesion  Date/Time: 9/12/2018 1:00 PM  Performed by: MING AGRAWAL  Authorized by: MING AGRAWAL   Preparation: Patient was prepped and draped in the usual sterile fashion.  Local anesthesia used: no    Anesthesia:  Local anesthesia used: no    Sedation:  Patient sedated: no  Patient tolerance: " Patient tolerated the procedure well with no immediate complications  Comments: Cryotherapy done on three warts of fingers of left hand.           PMH:  Anxiety/depression-chronic, controlled with Zoloft.    The following portions of the patient's history were reviewed and updated as appropriate: allergies, current medications, past family history, past medical history, past social history, past surgical history and problem list.    Review of Systems   Constitutional: Positive for activity change (cannot use right arm at all). Negative for appetite change, chills, diaphoresis, fatigue, fever and unexpected weight change.   HENT: Negative for congestion, ear discharge, ear pain, hearing loss, postnasal drip, rhinorrhea, sinus pain, sinus pressure, sneezing, sore throat, tinnitus and trouble swallowing.    Eyes: Negative.    Respiratory: Negative for cough, chest tightness, shortness of breath and wheezing.    Cardiovascular: Negative for chest pain, palpitations and leg swelling.   Gastrointestinal: Positive for nausea (only with excruiating pain). Negative for abdominal distention, abdominal pain, blood in stool, constipation, diarrhea and vomiting.   Genitourinary: Negative for decreased urine volume, discharge, dysuria, flank pain, frequency, hematuria, penile pain, penile swelling, scrotal swelling, testicular pain and urgency.   Musculoskeletal: Positive for arthralgias (right shoulder, right knee).   Neurological: Negative for dizziness, tremors, seizures, syncope, weakness, light-headedness, numbness and headaches.   Hematological: Does not bruise/bleed easily.   Psychiatric/Behavioral: Positive for sleep disturbance (improved with seroquel). Negative for agitation, behavioral problems, decreased concentration, self-injury and suicidal ideas. The patient is nervous/anxious (improved with zoloft).        Past Medical History:   Diagnosis Date   • Depression        Family History   Problem Relation Age of Onset  "  • No Known Problems Mother    • Hypertension Father           Objective   /70   Pulse 92   Temp 99.7 °F (37.6 °C) (Temporal Artery )   Resp 16   Ht 162.6 cm (64\")   Wt 81.6 kg (180 lb)   SpO2 98%   BMI 30.90 kg/m²   Physical Exam   Constitutional: He appears well-developed and well-nourished. No distress.   Cardiovascular: Normal rate, regular rhythm, normal heart sounds and intact distal pulses.  Exam reveals no gallop and no friction rub.    No murmur heard.  Pulmonary/Chest: Effort normal and breath sounds normal. No respiratory distress. He has no wheezes. He has no rales.   Musculoskeletal:        Right shoulder: He exhibits decreased range of motion (extremely limited ROM of shoulder and elbow), tenderness (anterior grove of inner anterior aspect of shoulder), pain and decreased strength (patient unable to lift weight of right arm). He exhibits no bony tenderness, no swelling, no deformity, no spasm and normal pulse.        Right knee: He exhibits normal range of motion and no swelling. Tenderness found. Patellar tendon tenderness noted. No medial joint line, no lateral joint line, no MCL and no LCL tenderness noted.   Skin: Skin is warm and dry. Capillary refill takes less than 2 seconds. He is not diaphoretic. No pallor.        Psychiatric: He has a normal mood and affect. His behavior is normal.   Nursing note and vitals reviewed.       PHQ-2/PHQ-9 Depression Screening 9/12/2018   Little interest or pleasure in doing things 0   Feeling down, depressed, or hopeless 0   Total Score 0         Assessment/Plan   Ba was seen today for follow-up.    Diagnoses and all orders for this visit:    Viral warts, unspecified type  -     salicylic acid 17 % gel; Apply topically with applicator over wart at hs, cover with tape, leave x 8hrs or more, remove in am, repeat q 24 hrs prn    Insomnia, unspecified type  -     QUEtiapine (SEROQUEL) 50 MG tablet; Take 1-2 tablets at bedtime for " "sleep.    Nondisplaced fracture of greater tuberosity of right humerus, initial encounter for closed fracture    Acute pain of right shoulder    Other orders  -     Cryotherapy, Skin Lesion            Pt here for f/u on right shoulder pain/fx of right humerus-acute secondary to fall from horse. Moderately controlled with zanalfex and ibupofen prn. Pt will start PT for \"gentle active ROM\", phenergan for nausea prn. F/u after appt with dr palomino.     Insomnia-chronic, controlled with seroquel 50mg at hs.    Warts-acute, not improving  -cryotherapy performed x 3, prescribed salicyclic acid and educated about applying duct tape as well.     F/u after appt with Dr Palomino on 10/10/18.    Patient educated to follow-up sooner than next scheduled appointment if condition(s) worse or do not improve. Patient states understanding and is in agreeance with plan of care. An After Visit Summary was printed and given to the patient.      VIJAYA Tenorio        This document has been electronically signed by VIJAYA Tenorio on September 27, 2018 9:33 PM      EMR/Transcription Dragon Disclaimer:  Some of this note may be an electronic dragon transcription/translation of spoken language to printed text. The electronic translation of spoken language may permit erroneous, or at times, nonsensical words or phrases to be inadvertently transcribed. Although I have reviewed the note for such errors, some may still exist.     "

## 2018-10-09 DIAGNOSIS — S42.254A NONDISPLACED FRACTURE OF GREATER TUBEROSITY OF RIGHT HUMERUS, INITIAL ENCOUNTER FOR CLOSED FRACTURE: Primary | ICD-10-CM

## 2018-10-10 ENCOUNTER — OFFICE VISIT (OUTPATIENT)
Dept: ORTHOPEDIC SURGERY | Facility: CLINIC | Age: 33
End: 2018-10-10

## 2018-10-10 VITALS — HEIGHT: 64 IN | WEIGHT: 185 LBS | BODY MASS INDEX: 31.58 KG/M2

## 2018-10-10 DIAGNOSIS — S42.254A NONDISPLACED FRACTURE OF GREATER TUBEROSITY OF RIGHT HUMERUS, INITIAL ENCOUNTER FOR CLOSED FRACTURE: Primary | ICD-10-CM

## 2018-10-10 PROCEDURE — 99024 POSTOP FOLLOW-UP VISIT: CPT | Performed by: ORTHOPAEDIC SURGERY

## 2018-10-10 NOTE — PROGRESS NOTES
"Ba Snow is a 33 y.o. male returns for     Chief Complaint   Patient presents with   • Right Shoulder - Follow-up       HISTORY OF PRESENT ILLNESS: Patient is here today for recheck of right humerus fracture. Patient was sent to xray upon arrival.  He is 2 months out overall is improved he is in physical therapy at this point       CONCURRENT MEDICAL HISTORY:    The following portions of the patient's history were reviewed and updated as appropriate: allergies, current medications, past family history, past medical history, past social history, past surgical history and problem list.     ROS  No fevers or chills.  No chest pain or shortness of air.  No GI or  disturbances.    PHYSICAL EXAMINATION:       Ht 162.6 cm (64\")   Wt 83.9 kg (185 lb)   BMI 31.76 kg/m²     Physical Exam    GAIT:     []  Normal  []  Antalgic    Assistive device: []  None  []  Walker     []  Crutches  []  Cane     []  Wheelchair  []  Stretcher    Ortho Exam   Passive motion is intact some pain resistance of the supraspinatus as well as abduction overall motion is better.    Xr Shoulder 2+ View Right    Result Date: 9/13/2018  Narrative: 3 views right shoulder Comparisons prior x-ray Again seen is known greater tuberosity fracture with no displacement on 3 views. Impression: Nondisplaced greater tuberosity fracture unchanged    .  X-rays show some callus formation in the region of the greater tuberosity        ASSESSMENT:    Diagnoses and all orders for this visit:    Nondisplaced fracture of greater tuberosity of right humerus, initial encounter for closed fracture          PLAN I think the fracture is healing spontaneously.  If at least 6 weeks to 8 referring about the mind to see him in 6 weeks x-raying arrival probably release him at that time.    No Follow-up on file.    Kush Lou MD  "

## 2018-10-19 ENCOUNTER — OFFICE VISIT (OUTPATIENT)
Dept: FAMILY MEDICINE CLINIC | Facility: CLINIC | Age: 33
End: 2018-10-19

## 2018-10-19 VITALS
BODY MASS INDEX: 31.41 KG/M2 | WEIGHT: 184 LBS | DIASTOLIC BLOOD PRESSURE: 80 MMHG | HEART RATE: 86 BPM | HEIGHT: 64 IN | SYSTOLIC BLOOD PRESSURE: 130 MMHG

## 2018-10-19 DIAGNOSIS — M25.511 ACUTE PAIN OF RIGHT SHOULDER: ICD-10-CM
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
## 2018-10-19 DIAGNOSIS — S42.254A NONDISPLACED FRACTURE OF GREATER TUBEROSITY OF RIGHT HUMERUS, INITIAL ENCOUNTER FOR CLOSED FRACTURE: ICD-10-CM

## 2018-10-19 PROCEDURE — 99213 OFFICE O/P EST LOW 20 MIN: CPT | Performed by: NURSE PRACTITIONER

## 2018-11-05 NOTE — PROGRESS NOTES
"Subjective   Ba Snow is a 33 y.o. male who presents to the office for f/u after ortho appt.     History of Present Illness     On 8/10/18, patient was thrown from a horse, along with his son.  Point of impact was right shoulder and right side.  Patient complaint of right shoulder/arm pain, right sided pain, and right knee pain.  Due to his son injuries being more concerning he did not go to the ER until 8/11/18, ER report per [facility] on [place] is summarized during office visit on 8/13/18.  See office visit 8/13/18 for full report on incident.    Pt saw Dr. Gale ortho last on 10/19/19 for f/u on fx of right humerus. Pt two mtns out from fx, improving with PT. Xray of right shoulder states no change of fx. POC: ortho says fx healing spontaneously, predicts 6-8 weeks, f/u in isx weeks.    Today 10/19/18, pt states pain has improved and controlled with ibuprofen and zanaflex prn. Two mtns out from accident, pt seeing ortho for fx of right humerus. Patient doing PT 3x/week of \"gentle active ROM\" with noffsinger. Pt still off work til fx is healed.     8/11/18: CT head without contrast: Impression: Negative  8/11/18: X-ray of right shoulder: Impression: Negative  8/16/18: MRI of right shoulder: Acute to subacute appearing mildly displaced and slightly communited fracture of greater tuberosity, mild infraspinatus and supraspinatus tendinosis, mild glenohumeral joint primary osteoarthritic changes, mild subacromial subdeltoid bursitis.  8/26/18 xray of right shoulder per dr gale: minimally displaced greater tuberosity fracture.     Past Medical History:  Insomnia-chronic, controlled with seroquel 50mg at hs. (failed on amitriptyline)  Anxiety/depression-chronic, controlled with Zoloft.    The following portions of the patient's history were reviewed and updated as appropriate: allergies, current medications, past family history, past medical history, past social history, past " "surgical history and problem list.    Review of Systems   Constitutional: Negative.    Respiratory: Negative.    Cardiovascular: Negative.    Musculoskeletal: Positive for arthralgias (right shoulder pain). Negative for back pain and gait problem.       Past Medical History:   Diagnosis Date   • Depression        Family History   Problem Relation Age of Onset   • No Known Problems Mother    • Hypertension Father           Objective   /80   Pulse 86   Ht 162.6 cm (64\")   Wt 83.5 kg (184 lb)   BMI 31.58 kg/m²   Physical Exam   Constitutional: He is oriented to person, place, and time. He appears well-developed and well-nourished. He is cooperative. No distress.   Cardiovascular: Normal rate, regular rhythm, normal heart sounds and intact distal pulses.  Exam reveals no gallop and no friction rub.    No murmur heard.  Pulmonary/Chest: Effort normal and breath sounds normal. No respiratory distress. He has no wheezes. He has no rales.   Abdominal: Soft. Normal appearance and bowel sounds are normal.   Musculoskeletal:        Right shoulder: He exhibits decreased range of motion, tenderness, laceration and pain.   Neurological: He is alert and oriented to person, place, and time. He is not disoriented. Coordination and gait normal.   Skin: Skin is warm and dry.   Psychiatric: He has a normal mood and affect. His speech is normal and behavior is normal. He is not actively hallucinating. He is attentive.   Nursing note and vitals reviewed.       PHQ-2/PHQ-9 Depression Screening 9/12/2018   Little interest or pleasure in doing things 0   Feeling down, depressed, or hopeless 0   Total Score 0         Assessment/Plan   Ba was seen today for follow-up.    Diagnoses and all orders for this visit:    Fall, sequela    Nondisplaced fracture of greater tuberosity of right humerus, initial encounter for closed fracture    Acute pain of right shoulder            Pt here for f/u from fall from horse that occurred on " 8/10//18 resulting in fx of right humerus. Pt seeing ortho and going to PT, will continue ibuprofen and zanaflex prn. Off work til healed, f/u with ortho in six weeks and me afterwards.     Patient educated to follow-up sooner than next scheduled appointment if condition(s) worse or do not improve. Patient states understanding and is in agreeance with plan of care. An After Visit Summary was printed and given to the patient.      VIJAYA Tenorio        This document has been electronically signed by VIJAYA Tenorio on November 4, 2018 9:30 PM      EMR/Transcription Dragon Disclaimer:  Some of this note may be an electronic dragon transcription/translation of spoken language to printed text. The electronic translation of spoken language may permit erroneous, or at times, nonsensical words or phrases to be inadvertently transcribed. Although I have reviewed the note for such errors, some may still exist.

## 2018-11-14 ENCOUNTER — TELEPHONE (OUTPATIENT)
Dept: ORTHOPEDIC SURGERY | Facility: CLINIC | Age: 33
End: 2018-11-14

## 2018-11-14 NOTE — TELEPHONE ENCOUNTER
Left message to have patient call back. Dr. Lou would like to know how patient is doing before he orders more physical therapy.

## 2018-11-20 DIAGNOSIS — S42.254A NONDISPLACED FRACTURE OF GREATER TUBEROSITY OF RIGHT HUMERUS, INITIAL ENCOUNTER FOR CLOSED FRACTURE: Primary | ICD-10-CM

## 2018-11-21 ENCOUNTER — OFFICE VISIT (OUTPATIENT)
Dept: ORTHOPEDIC SURGERY | Facility: CLINIC | Age: 33
End: 2018-11-21

## 2018-11-21 VITALS — WEIGHT: 189 LBS | BODY MASS INDEX: 32.27 KG/M2 | HEIGHT: 64 IN

## 2018-11-21 DIAGNOSIS — S42.254A NONDISPLACED FRACTURE OF GREATER TUBEROSITY OF RIGHT HUMERUS, INITIAL ENCOUNTER FOR CLOSED FRACTURE: Primary | ICD-10-CM

## 2018-11-21 PROCEDURE — 99024 POSTOP FOLLOW-UP VISIT: CPT | Performed by: ORTHOPAEDIC SURGERY

## 2018-11-21 NOTE — PROGRESS NOTES
"Ba Snow is a 33 y.o. male returns for     Chief Complaint   Patient presents with   • Right Shoulder - Follow-up       HISTORY OF PRESENT ILLNESS: Patient is here today for follow up of right shoulder. Patient states that his pain is 3/10. He was sent to [facility] upon arrival.        CONCURRENT MEDICAL HISTORY:    The following portions of the patient's history were reviewed and updated as appropriate: allergies, current medications, past family history, past medical history, past social history, past surgical history and problem list.     ROS  No fevers or chills.  No chest pain or shortness of air.  No GI or  disturbances.    PHYSICAL EXAMINATION:       Ht 162.6 cm (64\")   Wt 85.7 kg (189 lb)   BMI 32.44 kg/m²     Physical Exam    GAIT:     []  Normal  []  Antalgic    Assistive device: []  None  []  Walker     []  Crutches  []  Cane     []  Wheelchair  []  Stretcher    Ortho Exam  Motion is improved still has some limitation of active abduction and forward flexion.    No results found.     Repeat 3 views of the shoulder show no change.      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  He is not ready go back to work full duty on check in a month and related go back when he feels like he can do his job.    No Follow-up on file.    Kush Lou MD  "

## 2018-12-17 DIAGNOSIS — S42.254A NONDISPLACED FRACTURE OF GREATER TUBEROSITY OF RIGHT HUMERUS, INITIAL ENCOUNTER FOR CLOSED FRACTURE: Primary | ICD-10-CM

## 2018-12-19 ENCOUNTER — OFFICE VISIT (OUTPATIENT)
Dept: ORTHOPEDIC SURGERY | Facility: CLINIC | Age: 33
End: 2018-12-19

## 2018-12-19 VITALS — WEIGHT: 193 LBS | HEIGHT: 64 IN | BODY MASS INDEX: 32.95 KG/M2

## 2018-12-19 DIAGNOSIS — S42.254A NONDISPLACED FRACTURE OF GREATER TUBEROSITY OF RIGHT HUMERUS, INITIAL ENCOUNTER FOR CLOSED FRACTURE: Primary | ICD-10-CM

## 2018-12-19 PROCEDURE — 99212 OFFICE O/P EST SF 10 MIN: CPT | Performed by: ORTHOPAEDIC SURGERY

## 2018-12-19 NOTE — PROGRESS NOTES
"Ba Snow is a 33 y.o. male returns for     Chief Complaint   Patient presents with   • Right Shoulder - Follow-up       HISTORY OF PRESENT ILLNESS: Patient is here today for recheck of right shoulder. Patient was sent to xray upon arrival. Patient states that his shoulder pain is 3/10 today.   Progressing doing better still has some pain or discomfort but overall is improved he is now 4 months out.     CONCURRENT MEDICAL HISTORY:    The following portions of the patient's history were reviewed and updated as appropriate: allergies, current medications, past family history, past medical history, past social history, past surgical history and problem list.     ROS  No fevers or chills.  No chest pain or shortness of air.  No GI or  disturbances.    PHYSICAL EXAMINATION:       Ht 162.6 cm (64\")   Wt 87.5 kg (193 lb)   BMI 33.13 kg/m²     Physical Exam   Constitutional: He is oriented to person, place, and time. He appears well-developed.   HENT:   Head: Normocephalic and atraumatic.   Eyes: EOM are normal. Pupils are equal, round, and reactive to light.   Neck: Neck supple. No tracheal deviation present.   Pulmonary/Chest: Effort normal.   Musculoskeletal: Normal range of motion. He exhibits tenderness. He exhibits no edema or deformity.   Neurological: He is alert and oriented to person, place, and time.   Skin: Skin is warm and dry. No erythema.   Psychiatric: He has a normal mood and affect.   Vitals reviewed.      GAIT:     [x]  Normal  []  Antalgic    Assistive device: []  None  []  Walker     []  Crutches  []  Cane     []  Wheelchair  []  Stretcher    Ortho Exam Tam well good abduction.  Moves well neurologically intact  Xr Shoulder 2+ View Right    Result Date: 11/21/2018  Narrative: 3 views right shoulder Comparison prior film Again seen is nondisplaced healing greater tuberosity fracture without other new findings Impression: Healing right greater tuberosity fracture    Three-view x-rays are " unchanged healing well        ASSESSMENT:    Diagnoses and all orders for this visit:    Nondisplaced fracture of greater tuberosity of right humerus, initial encounter for closed fracture          PLAN we'll send him back to work December 30 at his request he'll call follow-up as needed    No Follow-up on file.    Kush Lou MD

## 2020-03-26 ENCOUNTER — LAB (OUTPATIENT)
Dept: LAB | Facility: OTHER | Age: 35
End: 2020-03-26

## 2020-03-26 ENCOUNTER — OFFICE VISIT (OUTPATIENT)
Dept: FAMILY MEDICINE CLINIC | Facility: CLINIC | Age: 35
End: 2020-03-26

## 2020-03-26 VITALS
BODY MASS INDEX: 28.99 KG/M2 | WEIGHT: 174 LBS | HEIGHT: 65 IN | HEART RATE: 70 BPM | TEMPERATURE: 97.3 F | RESPIRATION RATE: 16 BRPM | OXYGEN SATURATION: 98 % | SYSTOLIC BLOOD PRESSURE: 104 MMHG | DIASTOLIC BLOOD PRESSURE: 64 MMHG

## 2020-03-26 DIAGNOSIS — Z13.220 SCREENING FOR HYPERLIPIDEMIA: ICD-10-CM

## 2020-03-26 DIAGNOSIS — Z13.0 SCREENING FOR DEFICIENCY ANEMIA: ICD-10-CM

## 2020-03-26 DIAGNOSIS — Z13.29 SCREENING FOR THYROID DISORDER: ICD-10-CM

## 2020-03-26 DIAGNOSIS — G47.00 INSOMNIA, UNSPECIFIED TYPE: ICD-10-CM

## 2020-03-26 DIAGNOSIS — R06.02 SOB (SHORTNESS OF BREATH): Primary | ICD-10-CM

## 2020-03-26 DIAGNOSIS — R68.89 HEAT INTOLERANCE: ICD-10-CM

## 2020-03-26 DIAGNOSIS — S42.254A NONDISPLACED FRACTURE OF GREATER TUBEROSITY OF RIGHT HUMERUS, INITIAL ENCOUNTER FOR CLOSED FRACTURE: ICD-10-CM

## 2020-03-26 DIAGNOSIS — Z82.49 FAMILY HISTORY OF CARDIAC DISORDER: ICD-10-CM

## 2020-03-26 DIAGNOSIS — Z13.1 SCREENING FOR DIABETES MELLITUS: ICD-10-CM

## 2020-03-26 DIAGNOSIS — F41.1 GAD (GENERALIZED ANXIETY DISORDER): ICD-10-CM

## 2020-03-26 DIAGNOSIS — R00.0 TACHYCARDIA: ICD-10-CM

## 2020-03-26 DIAGNOSIS — R53.83 OTHER FATIGUE: Primary | ICD-10-CM

## 2020-03-26 DIAGNOSIS — R19.7 DIARRHEA, UNSPECIFIED TYPE: ICD-10-CM

## 2020-03-26 DIAGNOSIS — M25.511 ACUTE PAIN OF RIGHT SHOULDER: ICD-10-CM

## 2020-03-26 LAB
ALBUMIN SERPL-MCNC: 4.1 G/DL (ref 3.5–5)
ALBUMIN/GLOB SERPL: 1.1 G/DL (ref 1.1–1.8)
ALP SERPL-CCNC: 84 U/L (ref 38–126)
ALT SERPL W P-5'-P-CCNC: 29 U/L
ANION GAP SERPL CALCULATED.3IONS-SCNC: 9 MMOL/L (ref 5–15)
AST SERPL-CCNC: 22 U/L (ref 17–59)
BASOPHILS # BLD AUTO: 0.03 10*3/MM3 (ref 0–0.2)
BASOPHILS NFR BLD AUTO: 0.4 % (ref 0–1.5)
BILIRUB SERPL-MCNC: 0.4 MG/DL (ref 0.2–1.3)
BUN BLD-MCNC: 15 MG/DL (ref 7–23)
BUN/CREAT SERPL: 16.1 (ref 7–25)
CALCIUM SPEC-SCNC: 8.9 MG/DL (ref 8.4–10.2)
CHLORIDE SERPL-SCNC: 106 MMOL/L (ref 101–112)
CHOLEST SERPL-MCNC: 240 MG/DL (ref 150–200)
CO2 SERPL-SCNC: 25 MMOL/L (ref 22–30)
CREAT BLD-MCNC: 0.93 MG/DL (ref 0.7–1.3)
DEPRECATED RDW RBC AUTO: 40.3 FL (ref 37–54)
EOSINOPHIL # BLD AUTO: 0.56 10*3/MM3 (ref 0–0.4)
EOSINOPHIL NFR BLD AUTO: 7.2 % (ref 0.3–6.2)
ERYTHROCYTE [DISTWIDTH] IN BLOOD BY AUTOMATED COUNT: 12.4 % (ref 12.3–15.4)
GFR SERPL CREATININE-BSD FRML MDRD: 94 ML/MIN/1.73 (ref 70–162)
GLOBULIN UR ELPH-MCNC: 3.6 GM/DL (ref 2.3–3.5)
GLUCOSE BLD-MCNC: 96 MG/DL (ref 70–99)
HCT VFR BLD AUTO: 41.8 % (ref 37.5–51)
HDLC SERPL-MCNC: 37 MG/DL (ref 40–59)
HGB BLD-MCNC: 14.3 G/DL (ref 13–17.7)
LDLC SERPL CALC-MCNC: 166 MG/DL
LDLC/HDLC SERPL: 4.5 {RATIO} (ref 0–3.55)
LYMPHOCYTES # BLD AUTO: 2.43 10*3/MM3 (ref 0.7–3.1)
LYMPHOCYTES NFR BLD AUTO: 31.2 % (ref 19.6–45.3)
MCH RBC QN AUTO: 31.2 PG (ref 26.6–33)
MCHC RBC AUTO-ENTMCNC: 34.2 G/DL (ref 31.5–35.7)
MCV RBC AUTO: 91.1 FL (ref 79–97)
MONOCYTES # BLD AUTO: 0.88 10*3/MM3 (ref 0.1–0.9)
MONOCYTES NFR BLD AUTO: 11.3 % (ref 5–12)
NEUTROPHILS # BLD AUTO: 3.9 10*3/MM3 (ref 1.7–7)
NEUTROPHILS NFR BLD AUTO: 49.9 % (ref 42.7–76)
PLATELET # BLD AUTO: 235 10*3/MM3 (ref 140–450)
PMV BLD AUTO: 8.3 FL (ref 6–12)
POTASSIUM BLD-SCNC: 4 MMOL/L (ref 3.4–5)
PROT SERPL-MCNC: 7.7 G/DL (ref 6.3–8.6)
RBC # BLD AUTO: 4.59 10*6/MM3 (ref 4.14–5.8)
SODIUM BLD-SCNC: 140 MMOL/L (ref 137–145)
TRIGL SERPL-MCNC: 183 MG/DL
VLDLC SERPL-MCNC: 36.6 MG/DL
WBC NRBC COR # BLD: 7.8 10*3/MM3 (ref 3.4–10.8)

## 2020-03-26 PROCEDURE — 84439 ASSAY OF FREE THYROXINE: CPT | Performed by: NURSE PRACTITIONER

## 2020-03-26 PROCEDURE — 36415 COLL VENOUS BLD VENIPUNCTURE: CPT | Performed by: NURSE PRACTITIONER

## 2020-03-26 PROCEDURE — 80061 LIPID PANEL: CPT | Performed by: NURSE PRACTITIONER

## 2020-03-26 PROCEDURE — 84443 ASSAY THYROID STIM HORMONE: CPT | Performed by: NURSE PRACTITIONER

## 2020-03-26 PROCEDURE — 80053 COMPREHEN METABOLIC PANEL: CPT | Performed by: NURSE PRACTITIONER

## 2020-03-26 PROCEDURE — 85025 COMPLETE CBC W/AUTO DIFF WBC: CPT | Performed by: NURSE PRACTITIONER

## 2020-03-26 PROCEDURE — 99214 OFFICE O/P EST MOD 30 MIN: CPT | Performed by: NURSE PRACTITIONER

## 2020-03-26 RX ORDER — ALBUTEROL SULFATE 90 UG/1
2 AEROSOL, METERED RESPIRATORY (INHALATION) EVERY 4 HOURS PRN
Qty: 18 G | Refills: 5 | Status: SHIPPED | OUTPATIENT
Start: 2020-03-26 | End: 2021-04-01

## 2020-03-26 RX ORDER — DICYCLOMINE HYDROCHLORIDE 10 MG/1
10 CAPSULE ORAL
Qty: 30 CAPSULE | Refills: 0 | Status: SHIPPED | OUTPATIENT
Start: 2020-03-26 | End: 2020-04-16

## 2020-03-26 RX ORDER — SERTRALINE HYDROCHLORIDE 100 MG/1
100 TABLET, FILM COATED ORAL DAILY
Qty: 30 TABLET | Refills: 5 | Status: SHIPPED | OUTPATIENT
Start: 2020-03-26 | End: 2021-04-01 | Stop reason: SDUPTHER

## 2020-03-26 RX ORDER — MELOXICAM 7.5 MG/1
7.5 TABLET ORAL DAILY
Qty: 30 TABLET | Refills: 5 | Status: SHIPPED | OUTPATIENT
Start: 2020-03-26 | End: 2021-04-01

## 2020-03-27 DIAGNOSIS — E78.2 MIXED HYPERLIPIDEMIA: Primary | ICD-10-CM

## 2020-03-27 LAB
T4 FREE SERPL-MCNC: 1.23 NG/DL (ref 0.93–1.7)
TSH SERPL DL<=0.05 MIU/L-ACNC: 3.02 UIU/ML (ref 0.27–4.2)

## 2020-03-27 RX ORDER — ATORVASTATIN CALCIUM 20 MG/1
TABLET, FILM COATED ORAL
Qty: 45 TABLET | Refills: 5 | Status: SHIPPED | OUTPATIENT
Start: 2020-03-27 | End: 2021-04-01

## 2020-04-13 PROBLEM — R19.7 DIARRHEA: Status: ACTIVE | Noted: 2020-04-13

## 2020-04-13 PROBLEM — Z82.49 FAMILY HISTORY OF CARDIAC DISORDER: Status: ACTIVE | Noted: 2020-04-13

## 2020-04-13 PROBLEM — R00.0 TACHYCARDIA: Status: ACTIVE | Noted: 2020-04-13

## 2020-04-13 PROBLEM — R68.89 HEAT INTOLERANCE: Status: ACTIVE | Noted: 2020-04-13

## 2020-04-13 PROBLEM — R06.02 SOB (SHORTNESS OF BREATH): Status: ACTIVE | Noted: 2020-04-13

## 2020-04-16 DIAGNOSIS — R19.7 DIARRHEA, UNSPECIFIED TYPE: ICD-10-CM

## 2020-04-16 RX ORDER — ONDANSETRON 4 MG/1
4 TABLET, FILM COATED ORAL EVERY 8 HOURS PRN
Qty: 30 TABLET | Refills: 0 | Status: SHIPPED | OUTPATIENT
Start: 2020-04-16 | End: 2021-04-01

## 2020-04-16 RX ORDER — DICYCLOMINE HCL 20 MG
20 TABLET ORAL EVERY 6 HOURS
Qty: 30 TABLET | Refills: 0 | Status: SHIPPED | OUTPATIENT
Start: 2020-04-16 | End: 2021-04-01

## 2020-04-21 ENCOUNTER — OFFICE VISIT (OUTPATIENT)
Dept: FAMILY MEDICINE CLINIC | Facility: CLINIC | Age: 35
End: 2020-04-21

## 2020-04-21 DIAGNOSIS — F33.0 MILD EPISODE OF RECURRENT MAJOR DEPRESSIVE DISORDER (HCC): ICD-10-CM

## 2020-04-21 DIAGNOSIS — R10.30 LOWER ABDOMINAL PAIN: ICD-10-CM

## 2020-04-21 DIAGNOSIS — R19.7 DIARRHEA, UNSPECIFIED TYPE: Primary | ICD-10-CM

## 2020-04-21 DIAGNOSIS — F41.1 GENERALIZED ANXIETY DISORDER: ICD-10-CM

## 2020-04-21 DIAGNOSIS — R11.0 NAUSEA: ICD-10-CM

## 2020-04-21 DIAGNOSIS — R21 RASH: ICD-10-CM

## 2020-04-21 DIAGNOSIS — G47.00 INSOMNIA, UNSPECIFIED TYPE: ICD-10-CM

## 2020-04-21 PROCEDURE — 99214 OFFICE O/P EST MOD 30 MIN: CPT | Performed by: NURSE PRACTITIONER

## 2020-04-21 RX ORDER — OMEPRAZOLE 40 MG/1
40 CAPSULE, DELAYED RELEASE ORAL DAILY
Qty: 30 CAPSULE | Refills: 5 | Status: SHIPPED | OUTPATIENT
Start: 2020-04-21 | End: 2021-04-01

## 2020-04-21 RX ORDER — DIPHENOXYLATE HYDROCHLORIDE AND ATROPINE SULFATE 2.5; .025 MG/1; MG/1
TABLET ORAL
Qty: 60 TABLET | Refills: 0 | Status: SHIPPED | OUTPATIENT
Start: 2020-04-21 | End: 2020-05-14 | Stop reason: SDUPTHER

## 2020-04-22 ENCOUNTER — LAB (OUTPATIENT)
Dept: LAB | Facility: OTHER | Age: 35
End: 2020-04-22

## 2020-04-22 DIAGNOSIS — R53.83 OTHER FATIGUE: ICD-10-CM

## 2020-04-22 PROCEDURE — 84402 ASSAY OF FREE TESTOSTERONE: CPT | Performed by: NURSE PRACTITIONER

## 2020-04-22 PROCEDURE — 82306 VITAMIN D 25 HYDROXY: CPT | Performed by: NURSE PRACTITIONER

## 2020-04-22 PROCEDURE — 82607 VITAMIN B-12: CPT | Performed by: NURSE PRACTITIONER

## 2020-04-22 PROCEDURE — 36415 COLL VENOUS BLD VENIPUNCTURE: CPT | Performed by: NURSE PRACTITIONER

## 2020-04-22 PROCEDURE — 84403 ASSAY OF TOTAL TESTOSTERONE: CPT | Performed by: NURSE PRACTITIONER

## 2020-04-23 LAB
25(OH)D3 SERPL-MCNC: 22.8 NG/ML (ref 30–100)
VIT B12 BLD-MCNC: 349 PG/ML (ref 211–946)

## 2020-04-24 LAB
TESTOST FREE SERPL-MCNC: 11.3 PG/ML (ref 8.7–25.1)
TESTOST SERPL-MCNC: 405 NG/DL (ref 264–916)

## 2020-04-30 ENCOUNTER — TELEPHONE (OUTPATIENT)
Dept: FAMILY MEDICINE CLINIC | Facility: CLINIC | Age: 35
End: 2020-04-30

## 2020-05-14 ENCOUNTER — OFFICE VISIT (OUTPATIENT)
Dept: FAMILY MEDICINE CLINIC | Facility: CLINIC | Age: 35
End: 2020-05-14

## 2020-05-14 DIAGNOSIS — R11.0 NAUSEA: ICD-10-CM

## 2020-05-14 DIAGNOSIS — A74.9 CHLAMYDIA: ICD-10-CM

## 2020-05-14 DIAGNOSIS — R19.7 DIARRHEA, UNSPECIFIED TYPE: ICD-10-CM

## 2020-05-14 DIAGNOSIS — K58.0 IRRITABLE BOWEL SYNDROME WITH DIARRHEA: Primary | ICD-10-CM

## 2020-05-14 PROCEDURE — 99442 PR PHYS/QHP TELEPHONE EVALUATION 11-20 MIN: CPT | Performed by: NURSE PRACTITIONER

## 2020-05-14 RX ORDER — DIPHENOXYLATE HYDROCHLORIDE AND ATROPINE SULFATE 2.5; .025 MG/1; MG/1
TABLET ORAL
Qty: 60 TABLET | Refills: 5 | Status: SHIPPED | OUTPATIENT
Start: 2020-05-14 | End: 2021-04-01

## 2020-05-14 RX ORDER — AZITHROMYCIN 500 MG/1
1000 TABLET, FILM COATED ORAL DAILY
Qty: 2 TABLET | Refills: 0 | Status: SHIPPED | OUTPATIENT
Start: 2020-05-14 | End: 2020-05-15

## 2020-05-19 ENCOUNTER — TELEPHONE (OUTPATIENT)
Dept: FAMILY MEDICINE CLINIC | Facility: CLINIC | Age: 35
End: 2020-05-19

## 2020-05-19 NOTE — TELEPHONE ENCOUNTER
----- Message from Tereso Wheat sent at 5/19/2020 10:51 AM CDT -----  Okay so there is more paperwork that needs to be completed .. He is going to resend it and needs faxed back when complete.    Ill let you know when it arrives.    Thanks  Olegario

## 2020-05-26 ENCOUNTER — DOCUMENTATION (OUTPATIENT)
Dept: FAMILY MEDICINE CLINIC | Facility: CLINIC | Age: 35
End: 2020-05-26

## 2020-05-26 NOTE — PROGRESS NOTES
From pt via [employer] message over the weekend:    Yes issa I was just wondering if u could give me a call at [phone]. There not wanting to approve any more of my time off with my symptoms I am having   I was just wondering if there was something else we could do because there's no way I can go back with as much as I go to the bathroom

## 2020-05-26 NOTE — PROGRESS NOTES
Linda,    Can you call patient and see what he would like to do. If he is viberzi regularly twice a day, if not start!    How often is he taking the lomotil, he says it takes a few hours to work but if he pays attention to his symptoms and works with it, he can find a happy balance between medication and slowing down diarrhea and side effects of constipation.  ((-if he wants help with this, tell him to take a diary for two days. Take lomotil and viberzi in the am and at bedtime. WRITE down what he eats when he eats, when he takes the meds (times) and when he has BMs and if diarrhea/pain etc. Like all the times. He could text them to himself in a note on his phone then send it to me via textPlus and I can work with him to adjust it. ))    Does he want to treat anxiety? That may help.

## 2020-06-01 ENCOUNTER — OFFICE VISIT (OUTPATIENT)
Dept: FAMILY MEDICINE CLINIC | Facility: CLINIC | Age: 35
End: 2020-06-01

## 2020-06-01 DIAGNOSIS — K58.0 IRRITABLE BOWEL SYNDROME WITH DIARRHEA: Primary | ICD-10-CM

## 2020-06-01 PROCEDURE — 99441 PR PHYS/QHP TELEPHONE EVALUATION 5-10 MIN: CPT | Performed by: NURSE PRACTITIONER

## 2020-06-01 NOTE — PROGRESS NOTES
Patient doing telephone visit lasted 8 minutes for FMLA paperwork  States he has not heard about his colonoscopy will look into.  States that he has been having diarrheal movements 6-7 times a day on average sometimes more.  He will be in the bathroom for maximum 10 minutes but usually less.  States that he could go up to every 30 minutes and that onset is immediate if he does not get to the bathroom in a minute or 2 he will have an incontinent bowel movement.  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  Stopped working on 4/17/2020.                                meds are a hit and miss. Lomotil twice per day. Some constipation. Cycling between constipation and diarrhea.      U/s of GB okay, no lab or hida scan done.     Works [employer] in [place]. They have fmla but has to be there for a June which is in June. But he does have short term sick leave. Trying to find a different job in the meantime. Anxiety is intense, very hot, and anxiety builds up. Still will have insurance through them.     June 10th.     Lomotil takes a few hours to calm it down.       Both grandparents had colon cancer. grandad in 60s.     Positive chlamydia-    IBS with diarrhea-chronic, worsening. New problem to me.   -3/26/20: he states when he eats, he has to go to bathroom asap. Been going on for a long time, couple years. With anything and everything. No nausea. Still having gallbladder.  Go a few times after. Sometimes with fluids as well. No mucus or blood.   -POC: most likely ibs or issues with GB, recommended u/s of GB and abd and f/u with GI, pt declined this for now, will start on bentyl and go from there.     -Blood off and on some but not lately.   -Really runny watery go several times a day a lot. Food worse. No mucus. If he doesn't go fast, gets, pain.     -Lower abd pain all over.  Chills at time.     -X two weeks. No n/v jsut sick to stomach. Doesn't wake him up int he middle of the night. Still happens if he doesn't eat.     -Higher dose of bentyl did not help at all.     -Lower arm itchy some but usually after two years reaction to tattoos.     -Before..no notice of certain foods, always goes after food.     No other sick contacts.       [facility] [place]     Been missing a lot of work. Need sick leave.     This past Friday-Monday. Maybe back Friday night.     culturelle samples.

## 2020-06-04 DIAGNOSIS — R10.30 LOWER ABDOMINAL PAIN: ICD-10-CM

## 2020-06-04 DIAGNOSIS — R19.7 DIARRHEA, UNSPECIFIED TYPE: Primary | ICD-10-CM

## 2020-06-04 PROBLEM — F41.1 GENERALIZED ANXIETY DISORDER: Status: ACTIVE | Noted: 2020-06-04

## 2020-06-04 PROBLEM — R21 RASH: Status: ACTIVE | Noted: 2020-06-04

## 2020-06-04 PROBLEM — R11.0 NAUSEA: Status: ACTIVE | Noted: 2020-06-04

## 2020-06-04 PROBLEM — G47.00 INSOMNIA: Status: ACTIVE | Noted: 2020-06-04

## 2020-06-04 PROBLEM — F33.0 MILD EPISODE OF RECURRENT MAJOR DEPRESSIVE DISORDER (HCC): Status: ACTIVE | Noted: 2020-06-04

## 2020-06-04 NOTE — PROGRESS NOTES
Subjective   Ba Snow is a 35 y.o. male who presents via telephone visit for FMLA paperwork.    History of Present Illness     Last labs done on 3/27/2020.  Patient states he will come in to do his pending order for testosterone vitamin D and B12 tomorrow.    Telephone visit lasted 8 minutes.    You have chosen to receive care through a telephone visit. Do you consent to use a telephone visit for your medical care today? Yes    IBS with diarrhea-chronic, new problem improving only mildly with Lomotil as needed, omeprazole, Viberzi and probiotic.  Possibly secondary to anxiety flareups pertaining to workplace stress  -3/26/20: Complaint of chronic diarrhea, several times a day watery with blood at times.  Denies mucus.  Still has gallbladder.  Prescribed Bentyl.    -4/21/2020: Failed Bentyl even at higher dosing, states it did not help at all.  States food and liquids make this worse.  Had acute worsening of symptoms with nausea possible GI bug x2 weeks on 4/21/2020.  Was placed on Lomotil, omeprazole, and probiotic.  Was also provided a week off work at this time.  -4/22/2020 ultrasound of gallbladder negative.  -5/14/2020: Patient states Lomotil twice daily causing some constipation, going 6-7 times per day, anxiety exacerbates this works in stressful situation with higher temperatures at work at [employer].  Referred to GI.  Extended FMLA to 6/10/2020.  -6/1/2020: Today patient is requesting FMLA paperwork.  Does want to see GI.  Has not been very compliant with taking his medications when he is supposed to.  States he is still having diarrheal BMs 6-7 times per day.  He states that if he goes to the bathroom it could be every 30 minutes but usually more time in between, states he could be into the bathroom for a maximum 10 minutes.  States that if he does not get to the bathroom immediately within a minute or 2 he will have an incontinent BM.  States onset of BM urged to go is  immediate.  Patient concern for possible incontinent bowels, trouble focusing at work and dizziness working and hotter temperatures with electrolyte imbalances due to not being able to drink continuously and constant diarrhea.  Has not been to work since 4/17/2020.  -Plan of care: Concern for patient not being compliant with medications, will insist that patient see GI for further work-up and GI determines whether or not he can return to work or not.  Will take patient off of work until he sees GI and gets further work-up.  Due to family history of colon cancer I do recommend he gets a colonoscopy if covered by insurance as it should with altered bowel movements and family history and blood in stool in the past.  I also recommend patient continues Viberzi omeprazole probiotic and Lomotil as needed until he sees GI and takes these consistently as discussed.  Filled out [place] paperwork pended on patient returning to work per GIs opinion.    Follow-up in one mtn if needed extension.     ______________________________      Past medical history:     Insomnia-chronic, controlled with seroquel 100mg at hs. (failed on amitriptyline)    Anxiety/depression-chronic, controlled with Zoloft 100 mg daily.    Chronic pain-chronic, uncontrolled with tylenol prn.   Constant pain, arthritis probably, tylenol prn as needed.   -poc: sounds like arthritis, failed tylenol, advil. Will try meloxicam lower dose daily, advised to avoid nsaids with this.     Right humerus fx/horse incident-chronic, improving.   -On 8/10/18, patient was thrown from a horse, along with his son.  Point of impact was right shoulder and right side.  Patient complaint of right shoulder/arm pain, right sided pain, and right knee pain.  Due to his son injuries being more concerning he did not go to the ER until 8/11/18, ER report per [facility] on [address] is summarized during office visit on 8/13/18.  See office visit 8/13/18 for full report on  "incident.  -Pt saw Dr. Gale ortho last on 10/19/19 for f/u on fx of right humerus. Pt two mtns out from fx, improving with PT. Xray of right shoulder states no change of fx. POC: ortho says fx healing spontaneously, predicts 6-8 weeks, f/u in isx weeks.  -previous OV with me 10/19/18, pt states pain has improved and controlled with ibuprofen and zanaflex prn. Two mtns out from accident, pt seeing ortho for fx of right humerus. Patient doing PT 3x/week of \"gentle active ROM\" with noffsinger. Pt still off work til fx is healed.   -radiology:   8/11/18: CT head without contrast: Impression: Negative  8/11/18: X-ray of right shoulder: Impression: Negative  8/16/18: MRI of right shoulder: Acute to subacute appearing mildly displaced and slightly communited fracture of greater tuberosity, mild infraspinatus and supraspinatus tendinosis, mild glenohumeral joint primary osteoarthritic changes, mild subacromial subdeltoid bursitis.  8/26/18 xray of right shoulder per dr gale: minimally displaced greater tuberosity fracture.   -TODAY 3/26/20: pt states not bothering him much.  -POC: continue to monitor.     Pt states fam hx of cardiac problems, he has been experiencing some SOB, tachycardia and heat intolerance past several mtns, not improving.  -POC: lab work up for acute onset of worsening heat intolerance, tachycardia, and SOB ordered, f/u pending labs, pt told if labs normal, to remind me to do further work up. Also started on albuterol but cautioned increased in tachycardia. Use before known exertion, let me know if working.     Exposure to chlamydia-acute, asymptomatic.  Was treated with Zithromax on 5/14/2020.    No surgical history.    Family history:   -PGM and PGF: colon cancer, unsure of age of onset  -MGF and MGM: he thinks had colon cancer as well.   -MGM: blood clots, unsure about age of onset, does know she has Open heart sx before age 95.     The following portions of the patient's history were " reviewed and updated as appropriate: allergies, current medications, past family history, past medical history, past social history, past surgical history and problem list.    Review of Systems   Constitutional: Negative.    HENT: Negative.    Respiratory: Negative for shortness of breath, cough, choking, chest tightness and wheezing.    Cardiovascular: Negative.    Gastrointestinal: Positive for abdominal pain and diarrhea. Negative for abdominal distention, anal bleeding, blood in stool, constipation, nausea, rectal pain and vomiting.   Endocrine: Positive for heat intolerance.   Musculoskeletal: Positive for arthralgias (right shoulder pain). Negative for back pain and gait problem.   Psychiatric/Behavioral: Positive for dysphoric mood and sleep disturbance.  Patient does state mood is improving now.  Negative for agitation, behavioral problems, confusion, decreased concentration, hallucinations, self-injury and suicidal ideas. The patient is nervous/anxious. The patient is not hyperactive.        Objective   There were no vitals taken for this visit.  Physical Exam   No physical exam completed due to this being a telephone visit.  Patient is alert cooperative and pleasant during physical exam.  His tone of voice is consistent with previous visits.    Assessment/Plan   Diagnoses and all orders for this visit:    Irritable bowel syndrome with diarrhea            Plan of Care:    Please See History of Present Illness(HPI) above for Plan of Care (POC) for individualized diagnoses as well as when to follow up.       Patient educated to follow-up sooner than next scheduled appointment if condition(s) worse or do not improve. Patient states understanding and is in agreeance with plan of care. An After Visit Summary was printed and given to the patient.      IVJAYA Tenorio        This document has been electronically signed by VIJAYA Tenorio on June 4, 2020 17:44      EMR/Transcription Dragon  Disclaimer:  Some of this note may be an electronic dragon transcription/translation of spoken language to printed text. The electronic translation of spoken language may permit erroneous, or at times, nonsensical words or phrases to be inadvertently transcribed. Although I have reviewed the note for such errors, some may still exist.

## 2020-06-04 NOTE — PROGRESS NOTES
Subjective   Ba Snow is a 35 y.o. male who presents via telephone visit for follow-up.    History of Present Illness     Last labs done on 3/27/2020.  Patient states he will come in to do his pending order for testosterone vitamin D and B12 tomorrow.    Telephone visit lasted about 20 minutes.    You have chosen to receive care through a telephone visit. Do you consent to use a telephone visit for your medical care today? Yes    IBS with diarrhea-chronic, new problem improving but not well with Lomotil as needed, omeprazole and probiotic.  Possibly secondary to anxiety flareups pertaining to workplace stress  -3/26/20: Complaint of chronic diarrhea, several times a day watery with blood at times.  Denies mucus.  Still has gallbladder.  Prescribed Bentyl.    -4/21/2020: Failed Bentyl even at higher dosing, states it did not help at all.  States food and liquids make this worse.  Had acute worsening of symptoms with nausea possible GI bug x2 weeks on 4/21/2020.  Was placed on Lomotil, omeprazole, and probiotic.  Was also provided a week off work at this time.  -4/22/2020 ultrasound of gallbladder negative.  -Today 5/14/2020: Patient states medications are hit or miss he is taking Lomotil twice a day is having some constipation with this.  Patient states Lomotil does help but takes a few hours to calm down well to prevent constipation with this.  Patient states he is very concerned about working with this situation, states he is going 6-7 times a day and sometimes if he does not get there fast enough he will have bowel incontinence.  Patient states that he has some anxiety about this that his job is very intense and build up some anxiety as well as it being a hot situation due to higher temperatures in the workplace at [employer].  Patient would like to do FMLA while he continues to try to get this under control states he does have short-term sick leave to help pay his bills.  Will also  start insurance during this time to do work-up.  -Plan of care: Patient is only had an ultrasound which was negative has not had further work-up.  At this point he is agreeable to see GI for further work-up.  We will put in referral.  Encouraged HIDA scan and lab work-up but patient wants to wait till he sees GI.  Educated patient on use of Lomotil and that he has to find a happy medium between diarrhea and constipation.  Patient declined offer to treat anxiety as this may help decrease his symptoms.  Also started patient on Viberzi for prevention.  Will put patient on FMLA until 6/10/2020.    Exposure to chlamydia-acute, asymptomatic.  - 5/14/2020: Patient states that wife got diagnosed with chlamydia, unsure of how this happened.  States that he would like treatment for it just to be safe.  States he is not symptomatic.  Patient denies any sexual contact with any other partners in the past several years so no risk of passing to another.  -Plan of care: Prescribed antibiotic coverage for gonorrhea and chlamydia just to be safe as they usually go hand-in-hand.  Explained to patient to avoid sexual intercourse for 7 days after treatment of him and his partner.  If any symptoms occur please, office.    Follow-up in one mtn if needed extension.     ______________________________      Past medical history:     Insomnia-chronic, controlled with seroquel 100mg at hs. (failed on amitriptyline)    Anxiety/depression-chronic, controlled with Zoloft 100 mg daily.    Chronic pain-chronic, uncontrolled with tylenol prn.   Constant pain, arthritis probably, tylenol prn as needed.   -poc: sounds like arthritis, failed tylenol, advil. Will try meloxicam lower dose daily, advised to avoid nsaids with this.     Right humerus fx/horse incident-chronic, improving.   -On 8/10/18, patient was thrown from a horse, along with his son.  Point of impact was right shoulder and right side.  Patient complaint of right shoulder/arm pain, right  "sided pain, and right knee pain.  Due to his son injuries being more concerning he did not go to the ER until 8/11/18, ER report per [facility] on [place] is summarized during office visit on 8/13/18.  See office visit 8/13/18 for full report on incident.  -Pt saw Dr. Gale ortho last on 10/19/19 for f/u on fx of right humerus. Pt two mtns out from fx, improving with PT. Xray of right shoulder states no change of fx. POC: ortho says fx healing spontaneously, predicts 6-8 weeks, f/u in isx weeks.  -previous OV with me 10/19/18, pt states pain has improved and controlled with ibuprofen and zanaflex prn. Two mtns out from accident, pt seeing ortho for fx of right humerus. Patient doing PT 3x/week of \"gentle active ROM\" with noffsinger. Pt still off work til fx is healed.   -radiology:   8/11/18: CT head without contrast: Impression: Negative  8/11/18: X-ray of right shoulder: Impression: Negative  8/16/18: MRI of right shoulder: Acute to subacute appearing mildly displaced and slightly communited fracture of greater tuberosity, mild infraspinatus and supraspinatus tendinosis, mild glenohumeral joint primary osteoarthritic changes, mild subacromial subdeltoid bursitis.  8/26/18 xray of right shoulder per dr gale: minimally displaced greater tuberosity fracture.   -TODAY 3/26/20: pt states not bothering him much.  -POC: continue to monitor.     Pt states fam hx of cardiac problems, he has been experiencing some SOB, tachycardia and heat intolerance past several mtns, not improving.  -POC: lab work up for acute onset of worsening heat intolerance, tachycardia, and SOB ordered, f/u pending labs, pt told if labs normal, to remind me to do further work up. Also started on albuterol but cautioned increased in tachycardia. Use before known exertion, let me know if working.     No surgical history.    Family history:   -PGM and PGF: colon cancer, unsure of age of onset  -MGF and MGM: he thinks had colon " cancer as well.   -MGM: blood clots, unsure about age of onset, does know she has Open heart sx before age 95.     The following portions of the patient's history were reviewed and updated as appropriate: allergies, current medications, past family history, past medical history, past social history, past surgical history and problem list.    Review of Systems   Constitutional: Negative.    HENT: Negative.    Respiratory: Negative for shortness of breath, cough, choking, chest tightness and wheezing.    Cardiovascular: Negative.    Gastrointestinal: Positive for abdominal pain and diarrhea. Negative for abdominal distention, anal bleeding, blood in stool, constipation, nausea, rectal pain and vomiting.   Endocrine: Positive for heat intolerance.   Musculoskeletal: Positive for arthralgias (right shoulder pain). Negative for back pain and gait problem.   Psychiatric/Behavioral: Positive for dysphoric mood and sleep disturbance.  Patient does state mood is improving now.  Negative for agitation, behavioral problems, confusion, decreased concentration, hallucinations, self-injury and suicidal ideas. The patient is nervous/anxious. The patient is not hyperactive.        Objective   There were no vitals taken for this visit.  Physical Exam   No physical exam completed due to this being a telephone visit.  Patient is alert cooperative and pleasant during physical exam.  His tone of voice is consistent with previous visits.    Assessment/Plan   Diagnoses and all orders for this visit:    Irritable bowel syndrome with diarrhea  -     Eluxadoline 100 MG tablet; Take 100 mg by mouth 2 (Two) Times a Day.    Diarrhea, unspecified type  -     diphenoxylate-atropine (Lomotil) 2.5-0.025 MG per tablet; Take 1-2 tablets up to 4x/day for diarrhea    Nausea  -     diphenoxylate-atropine (Lomotil) 2.5-0.025 MG per tablet; Take 1-2 tablets up to 4x/day for diarrhea    Chlamydia  -     azithromycin (ZITHROMAX) 500 MG tablet; Take 2  tablets by mouth Daily for 1 day.            Plan of Care:    Please See History of Present Illness(HPI) above for Plan of Care (POC) for individualized diagnoses as well as when to follow up.       Patient educated to follow-up sooner than next scheduled appointment if condition(s) worse or do not improve. Patient states understanding and is in agreeance with plan of care. An After Visit Summary was printed and given to the patient.      VIJAYA Tenorio        This document has been electronically signed by VIJAYA Tenorio on June 4, 2020 17:29      EMR/Transcription Dragon Disclaimer:  Some of this note may be an electronic dragon transcription/translation of spoken language to printed text. The electronic translation of spoken language may permit erroneous, or at times, nonsensical words or phrases to be inadvertently transcribed. Although I have reviewed the note for such errors, some may still exist.

## 2020-06-12 ENCOUNTER — TELEPHONE (OUTPATIENT)
Dept: FAMILY MEDICINE CLINIC | Facility: CLINIC | Age: 35
End: 2020-06-12

## 2020-06-12 NOTE — TELEPHONE ENCOUNTER
Betsy Jordan my  gave me a message from Dr. julio cesar valdes who is working on patient's short-term disability case, to call him back at [phone].  I called him and was unable to reach him left voicemail will try again on Monday.

## 2020-06-15 DIAGNOSIS — R10.30 LOWER ABDOMINAL PAIN: ICD-10-CM

## 2020-06-15 DIAGNOSIS — R19.7 DIARRHEA, UNSPECIFIED TYPE: Primary | ICD-10-CM

## 2020-06-16 ENCOUNTER — DOCUMENTATION (OUTPATIENT)
Dept: FAMILY MEDICINE CLINIC | Facility: CLINIC | Age: 35
End: 2020-06-16

## 2020-06-16 NOTE — PROGRESS NOTES
Chasidy,    Please call pt and let him know I spoke with dr black and reviewed all of the patient's history. GI has tried to contact patient twice in the past week for an appt and has not been able to get ahold of him. So we called to get him an appt and it is on   __________ date. Because of the fact that he has not contacted me to get his appt and it has been two weeks and GI cannot contact him, plus that fact that we can't prove the chriss of bowel movements/per day and we don't have a work up yet, he will have to return back to work because I cannot prolong his off time. If he has symptoms of electrolyte imbalances or dehydration, then we can do some lab work up. I also don't mind to order a hida scan to get this thing going faster, but without proof of diagnosis and it looking as if he is delaying his appts by not calling GI back, he will have to go back to work until he sees GI. They will contact him when the paperwork goes through. He will be allowed to miss work for GI of course and can't contact me if not able to handle it. But we just don't have enough to keep him out of work at this time. I recommend not missing GI appt and getting the entire work up they recommend and keeping a diary of bowel movements and symptoms in case he does need to be approved for more time off work. Please advise him that fmla and STD are touchy and he has to be compliant with f/u and keeping track of his appts.     Let me know when his GI appt is please.

## 2020-06-16 NOTE — PROGRESS NOTES
Spoke with Dr. Hopkins from [facility] today at 9am for patient's STD and FLMA. Pt is currently on fmla, referral placed to GI when pt was last seen on 6/1. Pt was contacted on two separate occasions and did not answer by GI for appts. Pt has had escalation of symptoms with minimum f/u and communication with me, he has been noncompliant with taking his medications as prescribed.  He has stated that he is having diarrheal bowel movements with possible incontinence up to 6-7 times a day but had a constipation with Lomotil twice daily.  This does not appear consistent with IBS or gallbladder induced diarrhea.  Patient does not want a HIDA scan or further work-up.  Lab work in March was normal and showed no signs of electrolyte imbalances.  Patient has not been consistent with trying to set up appointment with GI which was told was mandatory.    Due to this I have discussed with Dr. Hopkins that he has my verbal permission for patient to return to work.  If he has incontinent episodes at work or signs of dehydration/electrolyte imbalances he can come into my office to be evaluated.    Further time off work will have to be approved by GI.

## 2020-06-17 NOTE — PROGRESS NOTES
Tired calling patient 06/16/20 around 4 pm. It rang two times then went to voicemail. I left a message asking him to call office and I had appt info for him. I also tried calling 06/17/20 at 11:25. It rang then went to voicemail. I left another message asking him to call office.

## 2020-06-29 ENCOUNTER — DOCUMENTATION (OUTPATIENT)
Dept: FAMILY MEDICINE CLINIC | Facility: CLINIC | Age: 35
End: 2020-06-29

## 2020-06-29 DIAGNOSIS — Z13.220 SCREENING FOR HYPERLIPIDEMIA: ICD-10-CM

## 2020-06-29 DIAGNOSIS — E53.8 LOW SERUM VITAMIN B12: ICD-10-CM

## 2020-06-29 DIAGNOSIS — E55.9 VITAMIN D DEFICIENCY: ICD-10-CM

## 2020-06-29 DIAGNOSIS — Z13.0 SCREENING FOR DEFICIENCY ANEMIA: ICD-10-CM

## 2020-06-29 DIAGNOSIS — R53.83 OTHER FATIGUE: ICD-10-CM

## 2020-06-29 DIAGNOSIS — Z13.1 SCREENING FOR DIABETES MELLITUS: ICD-10-CM

## 2020-06-29 DIAGNOSIS — E78.2 MIXED HYPERLIPIDEMIA: Primary | ICD-10-CM

## 2020-06-29 NOTE — PROGRESS NOTES
Kyra Perkins Brandi Elyse, APRN             Since he started the meds you gave him he has been constipated.     Aware to get labs done in June/28th and make appt after. He is ok with doing a work up.    Previous Messages            Result Notes for Testosterone, Free, Total     Notes recorded by Kelsey Sanchez APRN on 4/30/2020 at 11:49 AM CDT    He has to find a happy balance between constipation and diarrhea. If constipation, he is taking too much. If no BM over 3 days please contact my office.    I will put in future labs for testosterone levels to be worked up as well as rechecked on or after 6/28 thanks :)  ------    Notes recorded by Kelsey Sanchez APRN on 4/30/2020 at 11:48 AM CDT  He has to find a happy balance between constipation and diarrhea. If constipation, he is taking too much. If no BM over 3 days please contact my office.  ------    Notes recorded by Kyra Perkins on 4/28/2020 at 2:11 PM CDT  Tried calling pt, no answer.  ------    Notes recorded by Kelsey Sanchez APRN on 4/28/2020 at 11:02 AM CDT  Testosterone right on cusp of being too low but not low enough for insurance to cover testosterone injections or to be recommended until they are lower than 405. I do recommend rechecking with next labs as well as a work up for why they are so low? Is that okay? If so please ask him to repeat labs on or after 6/28 and see me after and send this back to me. Also ask him how diarrhea is thanks.

## 2021-04-01 ENCOUNTER — OFFICE VISIT (OUTPATIENT)
Dept: FAMILY MEDICINE CLINIC | Facility: CLINIC | Age: 36
End: 2021-04-01

## 2021-04-01 VITALS
OXYGEN SATURATION: 98 % | SYSTOLIC BLOOD PRESSURE: 104 MMHG | BODY MASS INDEX: 29.99 KG/M2 | WEIGHT: 180 LBS | DIASTOLIC BLOOD PRESSURE: 80 MMHG | HEIGHT: 65 IN | HEART RATE: 83 BPM

## 2021-04-01 DIAGNOSIS — H61.22 LEFT EAR IMPACTED CERUMEN: ICD-10-CM

## 2021-04-01 DIAGNOSIS — H65.93 FLUID LEVEL BEHIND TYMPANIC MEMBRANE OF BOTH EARS: ICD-10-CM

## 2021-04-01 DIAGNOSIS — J30.1 SEASONAL ALLERGIC RHINITIS DUE TO POLLEN: ICD-10-CM

## 2021-04-01 DIAGNOSIS — Z13.6 SCREENING FOR HEART DISEASE: ICD-10-CM

## 2021-04-01 DIAGNOSIS — H66.001 ACUTE SUPPURATIVE OTITIS MEDIA OF RIGHT EAR WITHOUT SPONTANEOUS RUPTURE OF TYMPANIC MEMBRANE, RECURRENCE NOT SPECIFIED: Primary | ICD-10-CM

## 2021-04-01 DIAGNOSIS — E66.9 OBESITY (BMI 30-39.9): ICD-10-CM

## 2021-04-01 PROCEDURE — 99214 OFFICE O/P EST MOD 30 MIN: CPT | Performed by: NURSE PRACTITIONER

## 2021-04-01 RX ORDER — AMOXICILLIN AND CLAVULANATE POTASSIUM 875; 125 MG/1; MG/1
1 TABLET, FILM COATED ORAL 2 TIMES DAILY
Qty: 20 TABLET | Refills: 0 | Status: SHIPPED | OUTPATIENT
Start: 2021-04-01 | End: 2021-04-15 | Stop reason: HOSPADM

## 2021-04-01 RX ORDER — PREDNISONE 10 MG/1
TABLET ORAL
Qty: 18 TABLET | Refills: 0 | Status: SHIPPED | OUTPATIENT
Start: 2021-04-01 | End: 2021-04-15 | Stop reason: HOSPADM

## 2021-04-01 RX ORDER — FLUTICASONE PROPIONATE 50 MCG
1 SPRAY, SUSPENSION (ML) NASAL 2 TIMES DAILY
Qty: 16 G | Refills: 5 | Status: SHIPPED | OUTPATIENT
Start: 2021-04-01

## 2021-04-01 RX ORDER — SERTRALINE HYDROCHLORIDE 100 MG/1
100 TABLET, FILM COATED ORAL DAILY
Qty: 30 TABLET | Refills: 5 | Status: SHIPPED | OUTPATIENT
Start: 2021-04-01

## 2021-04-01 RX ORDER — MONTELUKAST SODIUM 10 MG/1
10 TABLET ORAL NIGHTLY
Qty: 30 TABLET | Refills: 5 | Status: SHIPPED | OUTPATIENT
Start: 2021-04-01

## 2021-04-01 RX ORDER — PHENTERMINE HYDROCHLORIDE 37.5 MG/1
37.5 TABLET ORAL
Qty: 30 TABLET | Refills: 0 | Status: SHIPPED | OUTPATIENT
Start: 2021-04-01

## 2021-04-13 ENCOUNTER — HOSPITAL ENCOUNTER (OUTPATIENT)
Facility: HOSPITAL | Age: 36
Setting detail: OBSERVATION
Discharge: HOME | End: 2021-04-15
Attending: EMERGENCY MEDICINE | Admitting: FAMILY MEDICINE

## 2021-04-13 ENCOUNTER — APPOINTMENT (OUTPATIENT)
Dept: CT IMAGING | Facility: HOSPITAL | Age: 36
End: 2021-04-13

## 2021-04-13 DIAGNOSIS — A41.9 SEPSIS, DUE TO UNSPECIFIED ORGANISM, UNSPECIFIED WHETHER ACUTE ORGAN DYSFUNCTION PRESENT (HCC): Primary | ICD-10-CM

## 2021-04-13 LAB
ALBUMIN SERPL-MCNC: 5.3 G/DL (ref 3.5–5.2)
ALBUMIN/GLOB SERPL: 1.3 G/DL
ALP SERPL-CCNC: 120 U/L (ref 39–117)
ALT SERPL W P-5'-P-CCNC: 31 U/L (ref 1–41)
ANION GAP SERPL CALCULATED.3IONS-SCNC: 20 MMOL/L (ref 5–15)
AST SERPL-CCNC: 24 U/L (ref 1–40)
BASOPHILS # BLD AUTO: 0.11 10*3/MM3 (ref 0–0.2)
BASOPHILS NFR BLD AUTO: 0.5 % (ref 0–1.5)
BILIRUB SERPL-MCNC: 0.8 MG/DL (ref 0–1.2)
BUN SERPL-MCNC: 20 MG/DL (ref 6–20)
BUN/CREAT SERPL: 14.2 (ref 7–25)
CALCIUM SPEC-SCNC: 11.1 MG/DL (ref 8.6–10.5)
CHLORIDE SERPL-SCNC: 101 MMOL/L (ref 98–107)
CO2 SERPL-SCNC: 17 MMOL/L (ref 22–29)
CREAT SERPL-MCNC: 1.41 MG/DL (ref 0.76–1.27)
DEPRECATED RDW RBC AUTO: 37.9 FL (ref 37–54)
EOSINOPHIL # BLD AUTO: 0.43 10*3/MM3 (ref 0–0.4)
EOSINOPHIL NFR BLD AUTO: 1.9 % (ref 0.3–6.2)
ERYTHROCYTE [DISTWIDTH] IN BLOOD BY AUTOMATED COUNT: 11.9 % (ref 12.3–15.4)
GFR SERPL CREATININE-BSD FRML MDRD: 57 ML/MIN/1.73
GLOBULIN UR ELPH-MCNC: 4.2 GM/DL
GLUCOSE SERPL-MCNC: 220 MG/DL (ref 65–99)
HCT VFR BLD AUTO: 50.2 % (ref 37.5–51)
HGB BLD-MCNC: 18 G/DL (ref 13–17.7)
IMM GRANULOCYTES # BLD AUTO: 0.21 10*3/MM3 (ref 0–0.05)
IMM GRANULOCYTES NFR BLD AUTO: 0.9 % (ref 0–0.5)
LIPASE SERPL-CCNC: 27 U/L (ref 13–60)
LYMPHOCYTES # BLD AUTO: 0.99 10*3/MM3 (ref 0.7–3.1)
LYMPHOCYTES NFR BLD AUTO: 4.3 % (ref 19.6–45.3)
MCH RBC QN AUTO: 31.5 PG (ref 26.6–33)
MCHC RBC AUTO-ENTMCNC: 35.9 G/DL (ref 31.5–35.7)
MCV RBC AUTO: 87.8 FL (ref 79–97)
MONOCYTES # BLD AUTO: 1.64 10*3/MM3 (ref 0.1–0.9)
MONOCYTES NFR BLD AUTO: 7.1 % (ref 5–12)
NEUTROPHILS NFR BLD AUTO: 19.7 10*3/MM3 (ref 1.7–7)
NEUTROPHILS NFR BLD AUTO: 85.3 % (ref 42.7–76)
NRBC BLD AUTO-RTO: 0 /100 WBC (ref 0–0.2)
PLATELET # BLD AUTO: 388 10*3/MM3 (ref 140–450)
PMV BLD AUTO: 8.9 FL (ref 6–12)
POTASSIUM SERPL-SCNC: 3.3 MMOL/L (ref 3.5–5.2)
PROT SERPL-MCNC: 9.5 G/DL (ref 6–8.5)
RBC # BLD AUTO: 5.72 10*6/MM3 (ref 4.14–5.8)
SODIUM SERPL-SCNC: 138 MMOL/L (ref 136–145)
TROPONIN T SERPL-MCNC: <0.01 NG/ML (ref 0–0.03)
WBC # BLD AUTO: 23.08 10*3/MM3 (ref 3.4–10.8)

## 2021-04-13 PROCEDURE — 80053 COMPREHEN METABOLIC PANEL: CPT | Performed by: EMERGENCY MEDICINE

## 2021-04-13 PROCEDURE — 93005 ELECTROCARDIOGRAM TRACING: CPT | Performed by: EMERGENCY MEDICINE

## 2021-04-13 PROCEDURE — 83735 ASSAY OF MAGNESIUM: CPT | Performed by: INTERNAL MEDICINE

## 2021-04-13 PROCEDURE — 93010 ELECTROCARDIOGRAM REPORT: CPT | Performed by: INTERNAL MEDICINE

## 2021-04-13 PROCEDURE — 99284 EMERGENCY DEPT VISIT MOD MDM: CPT

## 2021-04-13 PROCEDURE — 85025 COMPLETE CBC W/AUTO DIFF WBC: CPT | Performed by: EMERGENCY MEDICINE

## 2021-04-13 PROCEDURE — 96365 THER/PROPH/DIAG IV INF INIT: CPT

## 2021-04-13 PROCEDURE — 84484 ASSAY OF TROPONIN QUANT: CPT | Performed by: EMERGENCY MEDICINE

## 2021-04-13 PROCEDURE — 83690 ASSAY OF LIPASE: CPT | Performed by: EMERGENCY MEDICINE

## 2021-04-13 PROCEDURE — 74177 CT ABD & PELVIS W/CONTRAST: CPT

## 2021-04-13 RX ORDER — ONDANSETRON 2 MG/ML
4 INJECTION INTRAMUSCULAR; INTRAVENOUS ONCE
Status: COMPLETED | OUTPATIENT
Start: 2021-04-13 | End: 2021-04-14

## 2021-04-13 RX ORDER — SODIUM CHLORIDE 0.9 % (FLUSH) 0.9 %
10 SYRINGE (ML) INJECTION AS NEEDED
Status: DISCONTINUED | OUTPATIENT
Start: 2021-04-13 | End: 2021-04-15 | Stop reason: HOSPADM

## 2021-04-13 NOTE — PROGRESS NOTES
"Subjective   Ba Snow is a 36 y.o. male who presents to the office for ear pain.     History of Present Illness     Last labs: N/A    Chronic allergic rhinitis-uncontrolled    Right ear pain-acute not improving    Weight loss-wants to try phenteminer.     ekg completed.       F/U:   _________________________________      The following portions of the patient's history were reviewed and updated as appropriate: allergies, current medications, past family history, past medical history, past social history, past surgical history and problem list.    Review of Systems   Constitutional: Negative for activity change, appetite change, chills, diaphoresis, fatigue and fever.   HENT: Positive for congestion, postnasal drip, sinus pressure and sinus pain. Negative for ear discharge, ear pain, facial swelling, rhinorrhea, sneezing, sore throat, tinnitus, trouble swallowing and voice change.    Eyes: Negative.    Respiratory: Negative for cough, chest tightness, shortness of breath and wheezing.    Cardiovascular: Negative for chest pain, palpitations and leg swelling.   Gastrointestinal: Negative.    Musculoskeletal: Negative for myalgias.   Skin: Negative.    Neurological: Positive for headaches. Negative for dizziness, weakness and light-headedness.         Objective   /80   Pulse 83   Ht 165.1 cm (65\")   Wt 81.6 kg (180 lb)   SpO2 98%   BMI 29.95 kg/m²     Body mass index is 29.95 kg/m².    Physical Exam  Vitals and nursing note reviewed.   Constitutional:       General: He is not in acute distress.     Appearance: Normal appearance. He is well-developed.   Cardiovascular:      Rate and Rhythm: Normal rate and regular rhythm.      Heart sounds: Normal heart sounds. No murmur heard.   No friction rub. No gallop.    Pulmonary:      Effort: Pulmonary effort is normal. No respiratory distress.      Breath sounds: Normal breath sounds. No wheezing or rales.   Abdominal:      General: Bowel sounds are normal. "      Palpations: Abdomen is soft.   Skin:     General: Skin is warm and dry.   Neurological:      Mental Status: He is alert and oriented to person, place, and time. He is not disoriented.      Coordination: Coordination normal.      Gait: Gait normal.   Psychiatric:         Attention and Perception: He is attentive.         Speech: Speech normal.         Behavior: Behavior normal. Behavior is cooperative.          PHQ-2/PHQ-9 Depression Screening 3/26/2020   Little interest or pleasure in doing things 3   Feeling down, depressed, or hopeless 3   Trouble falling or staying asleep, or sleeping too much 3   Feeling tired or having little energy 3   Poor appetite or overeating 3   Feeling bad about yourself - or that you are a failure or have let yourself or your family down 3   Trouble concentrating on things, such as reading the newspaper or watching television 0   Moving or speaking so slowly that other people could have noticed. Or the opposite - being so fidgety or restless that you have been moving around a lot more than usual 0   Thoughts that you would be better off dead, or of hurting yourself in some way 1   Total Score 19   If you checked off any problems, how difficult have these problems made it for you to do your work, take care of things at home, or get along with other people? Not difficult at all         Assessment/Plan   Diagnoses and all orders for this visit:    1. Acute suppurative otitis media of right ear without spontaneous rupture of tympanic membrane, recurrence not specified (Primary)  -     amoxicillin-clavulanate (Augmentin) 875-125 MG per tablet; Take 1 tablet by mouth 2 (Two) Times a Day.  Dispense: 20 tablet; Refill: 0    2. Left ear impacted cerumen  -     carbamide peroxide (Debrox) 6.5 % otic solution; Administer 5 drops into the left ear 2 (Two) Times a Day for 7 days. For ear wax build up  Dispense: 15 mL; Refill: 0    3. Fluid level behind tympanic membrane of both ears  -      fluticasone (FLONASE) 50 MCG/ACT nasal spray; 1 spray into the nostril(s) as directed by provider 2 (two) times a day.  Dispense: 16 g; Refill: 5  -     montelukast (SINGULAIR) 10 MG tablet; Take 1 tablet by mouth Every Night.  Dispense: 30 tablet; Refill: 5  -     predniSONE (DELTASONE) 10 MG tablet; Take [id number],[id number].  Dispense: 18 tablet; Refill: 0    4. Seasonal allergic rhinitis due to pollen  -     fluticasone (FLONASE) 50 MCG/ACT nasal spray; 1 spray into the nostril(s) as directed by provider 2 (two) times a day.  Dispense: 16 g; Refill: 5  -     montelukast (SINGULAIR) 10 MG tablet; Take 1 tablet by mouth Every Night.  Dispense: 30 tablet; Refill: 5  -     predniSONE (DELTASONE) 10 MG tablet; Take [id number],9crgePHi7alrd.  Dispense: 18 tablet; Refill: 0    5. Screening for heart disease  -     ECG 12 Lead; Future    6. Obesity (BMI 30-39.9)  -     phentermine (ADIPEX-P) 37.5 MG tablet; Take 1 tablet by mouth Every Morning Before Breakfast.  Dispense: 30 tablet; Refill: 0    Other orders  -     sertraline (Zoloft) 100 MG tablet; Take 1 tablet by mouth Daily.  Dispense: 30 tablet; Refill: 5             Plan of Care:    Please See History of Present Illness(HPI) above for Plan of Care (POC) for individualized diagnoses as well as when to follow up.     Patient educated to follow-up sooner than next scheduled appointment if condition(s) worse or do not improve. Patient states understanding and is in agreeance with plan of care. An After Visit Summary was printed and given to the patient.      VIJAYA Tenorio        This document has been electronically signed by VIJAYA Tenorio on April 13, 2021 06:34 CDT      Part of this note may be an electronic transcription/translation of spoken language to printed text using the Dragon Dictation System

## 2021-04-14 ENCOUNTER — APPOINTMENT (OUTPATIENT)
Dept: CARDIOLOGY | Facility: HOSPITAL | Age: 36
End: 2021-04-14

## 2021-04-14 PROBLEM — K52.9 GASTROENTERITIS: Status: ACTIVE | Noted: 2021-04-14

## 2021-04-14 PROBLEM — A41.9 SEPSIS (HCC): Status: ACTIVE | Noted: 2021-04-14

## 2021-04-14 LAB
ADV 40+41 DNA STL QL NAA+NON-PROBE: NOT DETECTED
ANION GAP SERPL CALCULATED.3IONS-SCNC: 11 MMOL/L (ref 5–15)
ASTRO TYP 1-8 RNA STL QL NAA+NON-PROBE: NOT DETECTED
BACTERIA UR QL AUTO: ABNORMAL /HPF
BASOPHILS # BLD AUTO: 0.05 10*3/MM3 (ref 0–0.2)
BASOPHILS NFR BLD AUTO: 0.3 % (ref 0–1.5)
BH CV ECHO MEAS - ACS: 2.4 CM
BH CV ECHO MEAS - AO MAX PG (FULL): -1.6 MMHG
BH CV ECHO MEAS - AO MAX PG: 5.3 MMHG
BH CV ECHO MEAS - AO MEAN PG (FULL): 0 MMHG
BH CV ECHO MEAS - AO MEAN PG: 3 MMHG
BH CV ECHO MEAS - AO ROOT AREA (BSA CORRECTED): 1.6
BH CV ECHO MEAS - AO ROOT AREA: 7.5 CM^2
BH CV ECHO MEAS - AO ROOT DIAM: 3.1 CM
BH CV ECHO MEAS - AO V2 MAX: 115 CM/SEC
BH CV ECHO MEAS - AO V2 MEAN: 78.3 CM/SEC
BH CV ECHO MEAS - AO V2 VTI: 20.8 CM
BH CV ECHO MEAS - ASC AORTA: 3.4 CM
BH CV ECHO MEAS - AVA(I,A): 3.9 CM^2
BH CV ECHO MEAS - AVA(I,D): 3.9 CM^2
BH CV ECHO MEAS - AVA(V,A): 3.9 CM^2
BH CV ECHO MEAS - AVA(V,D): 3.9 CM^2
BH CV ECHO MEAS - BSA(HAYCOCK): 2 M^2
BH CV ECHO MEAS - BSA: 1.9 M^2
BH CV ECHO MEAS - BZI_BMI: 30 KILOGRAMS/M^2
BH CV ECHO MEAS - BZI_METRIC_HEIGHT: 165.1 CM
BH CV ECHO MEAS - BZI_METRIC_WEIGHT: 81.6 KG
BH CV ECHO MEAS - EDV(CUBED): 113.4 ML
BH CV ECHO MEAS - EDV(MOD-SP2): 97.6 ML
BH CV ECHO MEAS - EDV(MOD-SP4): 96.9 ML
BH CV ECHO MEAS - EDV(TEICH): 109.6 ML
BH CV ECHO MEAS - EF(CUBED): 73.2 %
BH CV ECHO MEAS - EF(MOD-SP2): 64.2 %
BH CV ECHO MEAS - EF(MOD-SP4): 67.4 %
BH CV ECHO MEAS - EF(TEICH): 64.9 %
BH CV ECHO MEAS - ESV(CUBED): 30.4 ML
BH CV ECHO MEAS - ESV(MOD-SP2): 34.9 ML
BH CV ECHO MEAS - ESV(MOD-SP4): 31.6 ML
BH CV ECHO MEAS - ESV(TEICH): 38.5 ML
BH CV ECHO MEAS - FS: 35.5 %
BH CV ECHO MEAS - IVS/LVPW: 1
BH CV ECHO MEAS - IVSD: 0.91 CM
BH CV ECHO MEAS - LA DIMENSION: 3.5 CM
BH CV ECHO MEAS - LA/AO: 1.1
BH CV ECHO MEAS - LV DIASTOLIC VOL/BSA (35-75): 51.2 ML/M^2
BH CV ECHO MEAS - LV MASS(C)D: 150.7 GRAMS
BH CV ECHO MEAS - LV MASS(C)DI: 79.7 GRAMS/M^2
BH CV ECHO MEAS - LV MAX PG: 6.9 MMHG
BH CV ECHO MEAS - LV MEAN PG: 3 MMHG
BH CV ECHO MEAS - LV SYSTOLIC VOL/BSA (12-30): 16.7 ML/M^2
BH CV ECHO MEAS - LV V1 MAX: 131 CM/SEC
BH CV ECHO MEAS - LV V1 MEAN: 76.8 CM/SEC
BH CV ECHO MEAS - LV V1 VTI: 23.5 CM
BH CV ECHO MEAS - LVIDD: 4.8 CM
BH CV ECHO MEAS - LVIDS: 3.1 CM
BH CV ECHO MEAS - LVLD AP2: 8.8 CM
BH CV ECHO MEAS - LVLD AP4: 8.6 CM
BH CV ECHO MEAS - LVLS AP2: 7.1 CM
BH CV ECHO MEAS - LVLS AP4: 6.6 CM
BH CV ECHO MEAS - LVOT AREA (M): 3.5 CM^2
BH CV ECHO MEAS - LVOT AREA: 3.5 CM^2
BH CV ECHO MEAS - LVOT DIAM: 2.1 CM
BH CV ECHO MEAS - LVPWD: 0.9 CM
BH CV ECHO MEAS - MV A MAX VEL: 54.8 CM/SEC
BH CV ECHO MEAS - MV DEC SLOPE: 675 CM/SEC^2
BH CV ECHO MEAS - MV E MAX VEL: 66.4 CM/SEC
BH CV ECHO MEAS - MV E/A: 1.2
BH CV ECHO MEAS - MV MAX PG: 2.2 MMHG
BH CV ECHO MEAS - MV MEAN PG: 1 MMHG
BH CV ECHO MEAS - MV P1/2T MAX VEL: 75.5 CM/SEC
BH CV ECHO MEAS - MV P1/2T: 32.8 MSEC
BH CV ECHO MEAS - MV V2 MAX: 74.1 CM/SEC
BH CV ECHO MEAS - MV V2 MEAN: 42.9 CM/SEC
BH CV ECHO MEAS - MV V2 VTI: 16.5 CM
BH CV ECHO MEAS - MVA P1/2T LCG: 2.9 CM^2
BH CV ECHO MEAS - MVA(P1/2T): 6.7 CM^2
BH CV ECHO MEAS - MVA(VTI): 4.9 CM^2
BH CV ECHO MEAS - PA MAX PG: 4.8 MMHG
BH CV ECHO MEAS - PA V2 MAX: 109 CM/SEC
BH CV ECHO MEAS - RAP SYSTOLE: 5 MMHG
BH CV ECHO MEAS - RVDD: 2.7 CM
BH CV ECHO MEAS - RVSP: 24.2 MMHG
BH CV ECHO MEAS - SI(AO): 83 ML/M^2
BH CV ECHO MEAS - SI(CUBED): 43.9 ML/M^2
BH CV ECHO MEAS - SI(LVOT): 43 ML/M^2
BH CV ECHO MEAS - SI(MOD-SP2): 33.1 ML/M^2
BH CV ECHO MEAS - SI(MOD-SP4): 34.5 ML/M^2
BH CV ECHO MEAS - SI(TEICH): 37.6 ML/M^2
BH CV ECHO MEAS - SV(AO): 157 ML
BH CV ECHO MEAS - SV(CUBED): 83 ML
BH CV ECHO MEAS - SV(LVOT): 81.4 ML
BH CV ECHO MEAS - SV(MOD-SP2): 62.7 ML
BH CV ECHO MEAS - SV(MOD-SP4): 65.3 ML
BH CV ECHO MEAS - SV(TEICH): 71.1 ML
BH CV ECHO MEAS - TR MAX VEL: 219 CM/SEC
BILIRUB UR QL STRIP: NEGATIVE
BUN SERPL-MCNC: 18 MG/DL (ref 6–20)
BUN/CREAT SERPL: 17 (ref 7–25)
C CAYETANENSIS DNA STL QL NAA+NON-PROBE: NOT DETECTED
C COLI+JEJ+UPSA DNA STL QL NAA+NON-PROBE: NOT DETECTED
C DIFF TOX GENS STL QL NAA+PROBE: NEGATIVE
CALCIUM SPEC-SCNC: 8.4 MG/DL (ref 8.6–10.5)
CHLORIDE SERPL-SCNC: 107 MMOL/L (ref 98–107)
CLARITY UR: CLEAR
CO2 SERPL-SCNC: 21 MMOL/L (ref 22–29)
COLOR UR: YELLOW
CREAT SERPL-MCNC: 1.06 MG/DL (ref 0.76–1.27)
CRYPTOSP DNA STL QL NAA+NON-PROBE: NOT DETECTED
D-LACTATE SERPL-SCNC: 1.5 MMOL/L (ref 0.5–2)
D-LACTATE SERPL-SCNC: 2.2 MMOL/L (ref 0.5–2)
DEPRECATED RDW RBC AUTO: 38 FL (ref 37–54)
E HISTOLYT DNA STL QL NAA+NON-PROBE: NOT DETECTED
EAEC PAA PLAS AGGR+AATA ST NAA+NON-PRB: NOT DETECTED
EC STX1+STX2 GENES STL QL NAA+NON-PROBE: NOT DETECTED
EOSINOPHIL # BLD AUTO: 0.02 10*3/MM3 (ref 0–0.4)
EOSINOPHIL NFR BLD AUTO: 0.1 % (ref 0.3–6.2)
EPEC EAE GENE STL QL NAA+NON-PROBE: NOT DETECTED
ERYTHROCYTE [DISTWIDTH] IN BLOOD BY AUTOMATED COUNT: 11.9 % (ref 12.3–15.4)
ETEC LTA+ST1A+ST1B TOX ST NAA+NON-PROBE: NOT DETECTED
FLUAV RNA RESP QL NAA+PROBE: NOT DETECTED
FLUBV RNA RESP QL NAA+PROBE: NOT DETECTED
G LAMBLIA DNA STL QL NAA+NON-PROBE: NOT DETECTED
GFR SERPL CREATININE-BSD FRML MDRD: 80 ML/MIN/1.73
GLUCOSE SERPL-MCNC: 135 MG/DL (ref 65–99)
GLUCOSE UR STRIP-MCNC: NEGATIVE MG/DL
HCT VFR BLD AUTO: 41.6 % (ref 37.5–51)
HGB BLD-MCNC: 14.8 G/DL (ref 13–17.7)
HGB UR QL STRIP.AUTO: ABNORMAL
HOLD SPECIMEN: NORMAL
HYALINE CASTS UR QL AUTO: ABNORMAL /LPF
IMM GRANULOCYTES # BLD AUTO: 0.12 10*3/MM3 (ref 0–0.05)
IMM GRANULOCYTES NFR BLD AUTO: 0.7 % (ref 0–0.5)
KETONES UR QL STRIP: NEGATIVE
LEUKOCYTE ESTERASE UR QL STRIP.AUTO: NEGATIVE
LYMPHOCYTES # BLD AUTO: 0.34 10*3/MM3 (ref 0.7–3.1)
LYMPHOCYTES NFR BLD AUTO: 1.9 % (ref 19.6–45.3)
MAGNESIUM SERPL-MCNC: 1.9 MG/DL (ref 1.6–2.6)
MAXIMAL PREDICTED HEART RATE: 185 BPM
MCH RBC QN AUTO: 31.2 PG (ref 26.6–33)
MCHC RBC AUTO-ENTMCNC: 35.6 G/DL (ref 31.5–35.7)
MCV RBC AUTO: 87.8 FL (ref 79–97)
MONOCYTES # BLD AUTO: 0.99 10*3/MM3 (ref 0.1–0.9)
MONOCYTES NFR BLD AUTO: 5.6 % (ref 5–12)
NEUTROPHILS NFR BLD AUTO: 16.06 10*3/MM3 (ref 1.7–7)
NEUTROPHILS NFR BLD AUTO: 91.4 % (ref 42.7–76)
NITRITE UR QL STRIP: NEGATIVE
NOROVIRUS GI+II RNA STL QL NAA+NON-PROBE: DETECTED
NRBC BLD AUTO-RTO: 0 /100 WBC (ref 0–0.2)
P SHIGELLOIDES DNA STL QL NAA+NON-PROBE: NOT DETECTED
PH UR STRIP.AUTO: <=5 [PH] (ref 5–9)
PLATELET # BLD AUTO: 278 10*3/MM3 (ref 140–450)
PMV BLD AUTO: 9.1 FL (ref 6–12)
POTASSIUM SERPL-SCNC: 3.9 MMOL/L (ref 3.5–5.2)
PROT UR QL STRIP: ABNORMAL
RBC # BLD AUTO: 4.74 10*6/MM3 (ref 4.14–5.8)
RBC # UR: ABNORMAL /HPF
REF LAB TEST METHOD: ABNORMAL
RVA RNA STL QL NAA+NON-PROBE: NOT DETECTED
S ENT+BONG DNA STL QL NAA+NON-PROBE: NOT DETECTED
SAPO I+II+IV+V RNA STL QL NAA+NON-PROBE: NOT DETECTED
SARS-COV-2 RNA RESP QL NAA+PROBE: NOT DETECTED
SHIGELLA SP+EIEC IPAH ST NAA+NON-PROBE: NOT DETECTED
SODIUM SERPL-SCNC: 139 MMOL/L (ref 136–145)
SP GR UR STRIP: 1.07 (ref 1–1.03)
SQUAMOUS #/AREA URNS HPF: ABNORMAL /HPF
STRESS TARGET HR: 157 BPM
UROBILINOGEN UR QL STRIP: ABNORMAL
V CHOL+PARA+VUL DNA STL QL NAA+NON-PROBE: NOT DETECTED
V CHOLERAE DNA STL QL NAA+NON-PROBE: NOT DETECTED
WBC # BLD AUTO: 17.58 10*3/MM3 (ref 3.4–10.8)
WBC UR QL AUTO: ABNORMAL /HPF
WHOLE BLOOD HOLD SPECIMEN: NORMAL
WHOLE BLOOD HOLD SPECIMEN: NORMAL
Y ENTEROCOL DNA STL QL NAA+NON-PROBE: NOT DETECTED

## 2021-04-14 PROCEDURE — 96376 TX/PRO/DX INJ SAME DRUG ADON: CPT

## 2021-04-14 PROCEDURE — 87493 C DIFF AMPLIFIED PROBE: CPT | Performed by: INTERNAL MEDICINE

## 2021-04-14 PROCEDURE — 25010000002 ONDANSETRON PER 1 MG

## 2021-04-14 PROCEDURE — 96366 THER/PROPH/DIAG IV INF ADDON: CPT

## 2021-04-14 PROCEDURE — 96365 THER/PROPH/DIAG IV INF INIT: CPT

## 2021-04-14 PROCEDURE — 25010000002 ONDANSETRON PER 1 MG: Performed by: EMERGENCY MEDICINE

## 2021-04-14 PROCEDURE — 96361 HYDRATE IV INFUSION ADD-ON: CPT

## 2021-04-14 PROCEDURE — 25010000002 MORPHINE PER 10 MG: Performed by: INTERNAL MEDICINE

## 2021-04-14 PROCEDURE — G0378 HOSPITAL OBSERVATION PER HR: HCPCS

## 2021-04-14 PROCEDURE — 25010000002 HEPARIN (PORCINE) PER 1000 UNITS: Performed by: INTERNAL MEDICINE

## 2021-04-14 PROCEDURE — 93306 TTE W/DOPPLER COMPLETE: CPT | Performed by: INTERNAL MEDICINE

## 2021-04-14 PROCEDURE — 83605 ASSAY OF LACTIC ACID: CPT | Performed by: INTERNAL MEDICINE

## 2021-04-14 PROCEDURE — 83605 ASSAY OF LACTIC ACID: CPT | Performed by: EMERGENCY MEDICINE

## 2021-04-14 PROCEDURE — 96375 TX/PRO/DX INJ NEW DRUG ADDON: CPT

## 2021-04-14 PROCEDURE — 93306 TTE W/DOPPLER COMPLETE: CPT

## 2021-04-14 PROCEDURE — 25010000002 PIPERACILLIN SOD-TAZOBACTAM PER 1 G: Performed by: EMERGENCY MEDICINE

## 2021-04-14 PROCEDURE — 25010000002 PIPERACILLIN SOD-TAZOBACTAM PER 1 G: Performed by: INTERNAL MEDICINE

## 2021-04-14 PROCEDURE — 80048 BASIC METABOLIC PNL TOTAL CA: CPT | Performed by: INTERNAL MEDICINE

## 2021-04-14 PROCEDURE — 25010000002 IOPAMIDOL 61 % SOLUTION: Performed by: EMERGENCY MEDICINE

## 2021-04-14 PROCEDURE — 36415 COLL VENOUS BLD VENIPUNCTURE: CPT | Performed by: INTERNAL MEDICINE

## 2021-04-14 PROCEDURE — 96372 THER/PROPH/DIAG INJ SC/IM: CPT

## 2021-04-14 PROCEDURE — 87636 SARSCOV2 & INF A&B AMP PRB: CPT | Performed by: EMERGENCY MEDICINE

## 2021-04-14 PROCEDURE — 85025 COMPLETE CBC W/AUTO DIFF WBC: CPT | Performed by: INTERNAL MEDICINE

## 2021-04-14 PROCEDURE — 87040 BLOOD CULTURE FOR BACTERIA: CPT | Performed by: EMERGENCY MEDICINE

## 2021-04-14 PROCEDURE — 25010000002 MORPHINE PER 10 MG: Performed by: EMERGENCY MEDICINE

## 2021-04-14 PROCEDURE — 81001 URINALYSIS AUTO W/SCOPE: CPT | Performed by: EMERGENCY MEDICINE

## 2021-04-14 PROCEDURE — 0097U HC BIOFIRE FILMARRAY GI PANEL: CPT | Performed by: INTERNAL MEDICINE

## 2021-04-14 RX ORDER — ONDANSETRON 2 MG/ML
4 INJECTION INTRAMUSCULAR; INTRAVENOUS EVERY 6 HOURS PRN
Status: DISCONTINUED | OUTPATIENT
Start: 2021-04-14 | End: 2021-04-15 | Stop reason: HOSPADM

## 2021-04-14 RX ORDER — FLUTICASONE PROPIONATE 50 MCG
1 SPRAY, SUSPENSION (ML) NASAL DAILY
Status: DISCONTINUED | OUTPATIENT
Start: 2021-04-14 | End: 2021-04-15 | Stop reason: HOSPADM

## 2021-04-14 RX ORDER — POTASSIUM CHLORIDE 750 MG/1
20 CAPSULE, EXTENDED RELEASE ORAL ONCE
Status: COMPLETED | OUTPATIENT
Start: 2021-04-14 | End: 2021-04-14

## 2021-04-14 RX ORDER — SODIUM CHLORIDE, SODIUM LACTATE, POTASSIUM CHLORIDE, CALCIUM CHLORIDE 600; 310; 30; 20 MG/100ML; MG/100ML; MG/100ML; MG/100ML
150 INJECTION, SOLUTION INTRAVENOUS CONTINUOUS
Status: DISCONTINUED | OUTPATIENT
Start: 2021-04-14 | End: 2021-04-15 | Stop reason: HOSPADM

## 2021-04-14 RX ORDER — SODIUM CHLORIDE 9 MG/ML
INJECTION, SOLUTION INTRAVENOUS
Status: DISCONTINUED
Start: 2021-04-14 | End: 2021-04-14 | Stop reason: WASHOUT

## 2021-04-14 RX ORDER — ONDANSETRON 2 MG/ML
INJECTION INTRAMUSCULAR; INTRAVENOUS
Status: COMPLETED
Start: 2021-04-14 | End: 2021-04-14

## 2021-04-14 RX ORDER — SODIUM CHLORIDE 0.9 % (FLUSH) 0.9 %
10 SYRINGE (ML) INJECTION EVERY 12 HOURS SCHEDULED
Status: DISCONTINUED | OUTPATIENT
Start: 2021-04-14 | End: 2021-04-15 | Stop reason: HOSPADM

## 2021-04-14 RX ORDER — ACETAMINOPHEN 325 MG/1
650 TABLET ORAL EVERY 4 HOURS PRN
Status: DISCONTINUED | OUTPATIENT
Start: 2021-04-14 | End: 2021-04-15 | Stop reason: HOSPADM

## 2021-04-14 RX ORDER — SODIUM CHLORIDE 0.9 % (FLUSH) 0.9 %
10 SYRINGE (ML) INJECTION AS NEEDED
Status: DISCONTINUED | OUTPATIENT
Start: 2021-04-14 | End: 2021-04-15 | Stop reason: HOSPADM

## 2021-04-14 RX ORDER — NALOXONE HCL 0.4 MG/ML
0.4 VIAL (ML) INJECTION
Status: DISCONTINUED | OUTPATIENT
Start: 2021-04-14 | End: 2021-04-15 | Stop reason: HOSPADM

## 2021-04-14 RX ORDER — MONTELUKAST SODIUM 10 MG/1
10 TABLET ORAL NIGHTLY
Status: DISCONTINUED | OUTPATIENT
Start: 2021-04-14 | End: 2021-04-15 | Stop reason: HOSPADM

## 2021-04-14 RX ORDER — HEPARIN SODIUM 5000 [USP'U]/ML
5000 INJECTION, SOLUTION INTRAVENOUS; SUBCUTANEOUS EVERY 8 HOURS SCHEDULED
Status: DISCONTINUED | OUTPATIENT
Start: 2021-04-14 | End: 2021-04-15 | Stop reason: HOSPADM

## 2021-04-14 RX ORDER — MORPHINE SULFATE 2 MG/ML
2 INJECTION, SOLUTION INTRAMUSCULAR; INTRAVENOUS
Status: DISCONTINUED | OUTPATIENT
Start: 2021-04-14 | End: 2021-04-15 | Stop reason: HOSPADM

## 2021-04-14 RX ADMIN — ONDANSETRON 4 MG: 2 INJECTION INTRAMUSCULAR; INTRAVENOUS at 04:12

## 2021-04-14 RX ADMIN — SODIUM CHLORIDE 1000 ML: 900 INJECTION, SOLUTION INTRAVENOUS at 01:38

## 2021-04-14 RX ADMIN — MORPHINE SULFATE 2 MG: 2 INJECTION, SOLUTION INTRAMUSCULAR; INTRAVENOUS at 04:24

## 2021-04-14 RX ADMIN — HEPARIN SODIUM 5000 UNITS: 5000 INJECTION INTRAVENOUS; SUBCUTANEOUS at 15:29

## 2021-04-14 RX ADMIN — IOPAMIDOL 90 ML: 612 INJECTION, SOLUTION INTRAVENOUS at 00:24

## 2021-04-14 RX ADMIN — ONDANSETRON HYDROCHLORIDE 4 MG: 2 INJECTION, SOLUTION INTRAMUSCULAR; INTRAVENOUS at 04:12

## 2021-04-14 RX ADMIN — MORPHINE SULFATE 4 MG: 4 INJECTION INTRAVENOUS at 00:04

## 2021-04-14 RX ADMIN — POTASSIUM CHLORIDE 20 MEQ: 750 CAPSULE, EXTENDED RELEASE ORAL at 00:55

## 2021-04-14 RX ADMIN — SERTRALINE 100 MG: 50 TABLET, FILM COATED ORAL at 10:14

## 2021-04-14 RX ADMIN — MONTELUKAST SODIUM 10 MG: 10 TABLET, COATED ORAL at 21:24

## 2021-04-14 RX ADMIN — PIPERACILLIN SODIUM AND TAZOBACTAM SODIUM 3.38 G: 3; .375 INJECTION, POWDER, LYOPHILIZED, FOR SOLUTION INTRAVENOUS at 17:40

## 2021-04-14 RX ADMIN — HEPARIN SODIUM 5000 UNITS: 5000 INJECTION INTRAVENOUS; SUBCUTANEOUS at 21:24

## 2021-04-14 RX ADMIN — SODIUM CHLORIDE, PRESERVATIVE FREE 10 ML: 5 INJECTION INTRAVENOUS at 10:14

## 2021-04-14 RX ADMIN — SODIUM CHLORIDE, PRESERVATIVE FREE 10 ML: 5 INJECTION INTRAVENOUS at 21:24

## 2021-04-14 RX ADMIN — HEPARIN SODIUM 5000 UNITS: 5000 INJECTION INTRAVENOUS; SUBCUTANEOUS at 05:46

## 2021-04-14 RX ADMIN — ONDANSETRON 4 MG: 2 INJECTION INTRAMUSCULAR; INTRAVENOUS at 00:04

## 2021-04-14 RX ADMIN — SODIUM CHLORIDE 1000 ML: 9 INJECTION, SOLUTION INTRAVENOUS at 00:03

## 2021-04-14 RX ADMIN — PIPERACILLIN SODIUM AND TAZOBACTAM SODIUM 3.38 G: 3; .375 INJECTION, POWDER, LYOPHILIZED, FOR SOLUTION INTRAVENOUS at 10:14

## 2021-04-14 RX ADMIN — SODIUM CHLORIDE, POTASSIUM CHLORIDE, SODIUM LACTATE AND CALCIUM CHLORIDE 150 ML/HR: 600; 310; 30; 20 INJECTION, SOLUTION INTRAVENOUS at 17:07

## 2021-04-14 RX ADMIN — SODIUM CHLORIDE, POTASSIUM CHLORIDE, SODIUM LACTATE AND CALCIUM CHLORIDE 150 ML/HR: 600; 310; 30; 20 INJECTION, SOLUTION INTRAVENOUS at 04:13

## 2021-04-14 NOTE — ED NOTES
Pt c/o generalized abd pain, n,v,d that started around 9p tonight and came home from work, pt was on the toilet around 1030pm having vomiting and diarrhea and passed out, pt denies hitting his head, his girlfriend was at home and heard him fall off the toilet. Pt stated he has some blood in his stool yesterday but not since. Pt alert and oriented, cool to touch.     Eben Cisneros, RN  04/13/21 0903

## 2021-04-14 NOTE — H&P
[facility] Medicine Services  INPATIENT HISTORY AND PHYSICAL       Patient Care Team:  Kelsey Sanchez APRN as PCP - General (Nurse Practitioner)    Chief complaint   Chief Complaint   Patient presents with   • Abdominal Pain   • Vomiting   • Diarrhea   • Syncope       Subjective     Patient is a 36 y.o. male with a past medical history of depression, obesity, suspected irritable bowel syndrome who presents with complaints of sudden onset of abdominal pain, nausea, vomiting and diarrhea of a few hours duration yesterday.    The patient was in his usual state of health until around 4 PM yesterday when he developed lower abdominal pain which was intermittent in nature and nonradiating.  A few hours later, around 9 PM, he developed nausea and multiple episodes of vomiting that was nonbloody along with multiple episodes of diarrhea which was also nonbloody.  However patient stated he had a single episode of blood in his stools the day prior to presentation.  Patient was so weak that he passed out briefly, while he was on the commode at home and was found lying on the floor by his girlfriend.  Of note patient son also had gastroenteritis today and he reported that 2 of his coworkers at work had abdominal pain at work today.  He denies eating out at any restaurants recently over the last day or 2.  He denies fevers, dysuria or hematuria.    Patient's labs on arrival to the emergency room at [facility] were significant for leukocytosis.  He had elevated lactic acid of 2.2.  His creatinine was elevated at 1.4 and he had bicarb of 17.  CT scan showed no gross abnormality.  Patient received IV fluids and IV antibiotic with Zosyn in the emergency room.    Review of Systems   Constitutional: Negative for activity change, appetite change, chills, fatigue and fever.   HENT: Negative for congestion, rhinorrhea, sore throat and trouble swallowing.    Respiratory:  Negative for cough, chest tightness, shortness of breath and wheezing.    Cardiovascular: Negative for chest pain, palpitations and leg swelling.   Gastrointestinal: Positive for abdominal pain, diarrhea, nausea and vomiting. Negative for abdominal distention.   Genitourinary: Negative for difficulty urinating, dysuria and hematuria.   Musculoskeletal: Negative for arthralgias, back pain and myalgias.   Skin: Negative for pallor and rash.   Neurological: Negative for dizziness, syncope, weakness, light-headedness and headaches.   Hematological: Negative for adenopathy. Does not bruise/bleed easily.   Psychiatric/Behavioral: Negative for agitation and confusion. The patient is not nervous/anxious.      History  Past Medical History:   Diagnosis Date   • Depression      History reviewed. No pertinent surgical history.  Family History   Problem Relation Age of Onset   • No Known Problems Mother    • Hypertension Father    • Heart disease Father         CABG     Social History     Tobacco Use   • Smoking status: Never Smoker   • Smokeless tobacco: Never Used   Substance Use Topics   • Alcohol use: Yes     Comment: on weekends    • Drug use: No     (Not in a hospital admission)    Allergies:  Patient has no known allergies.  Prior to Admission medications    Medication Sig Start Date End Date Taking? Authorizing Provider   amoxicillin-clavulanate (Augmentin) 875-125 MG per tablet Take 1 tablet by mouth 2 (Two) Times a Day. 4/1/21   Kelsey Sanchez APRN   fluticasone (FLONASE) 50 MCG/ACT nasal spray 1 spray into the nostril(s) as directed by provider 2 (two) times a day. 4/1/21   Kelsey Sanchez APRN   montelukast (SINGULAIR) 10 MG tablet Take 1 tablet by mouth Every Night. 4/1/21   Kelsey Sanchez APRN   phentermine (ADIPEX-P) 37.5 MG tablet Take 1 tablet by mouth Every Morning Before Breakfast. 4/1/21   Kelsey Sanchez APRN   predniSONE (DELTASONE) 10 MG tablet Take  [id number]. 4/1/21   Kelsey Sanchez APRN   sertraline (Zoloft) 100 MG tablet Take 1 tablet by mouth Daily. 4/1/21   Kelsey Sanchez APRN       I have reviewed the patient's current medications    Objective        Vital Signs  Temp:  [95.3 °F (35.2 °C)] 95.3 °F (35.2 °C)  Heart Rate:  [78-97] 97  Resp:  [17-22] 17  BP: (100-186)/(55-79) 110/64      Physical Exam  Constitutional:       General: He is not in acute distress.     Appearance: Normal appearance. He is well-developed. He is not diaphoretic.   HENT:      Head: Normocephalic and atraumatic.   Eyes:      General: No scleral icterus.     Conjunctiva/sclera: Conjunctivae normal.      Pupils: Pupils are equal, round, and reactive to light.   Neck:      Thyroid: No thyromegaly.      Vascular: No JVD.      Trachea: No tracheal deviation.   Cardiovascular:      Rate and Rhythm: Normal rate and regular rhythm.      Heart sounds: Normal heart sounds. No murmur heard.   No friction rub. No gallop.    Pulmonary:      Effort: Pulmonary effort is normal. No respiratory distress.      Breath sounds: Normal breath sounds. No stridor. No wheezing or rales.   Chest:      Chest wall: No tenderness.   Abdominal:      General: Bowel sounds are normal. There is no distension.      Palpations: Abdomen is soft. There is no mass.      Tenderness: There is abdominal tenderness. There is no guarding or rebound.      Hernia: No hernia is present.      Comments: Mild tenderness in periumbilical and suprapubic region.  No rebound.   Musculoskeletal:         General: No tenderness or deformity. Normal range of motion.      Cervical back: Normal range of motion and neck supple.   Lymphadenopathy:      Cervical: No cervical adenopathy.   Skin:     General: Skin is warm and dry.      Coloration: Skin is not pale.      Findings: No erythema or rash.   Neurological:      Mental Status: He is alert and oriented to person, place, and time.      Cranial  Nerves: No cranial nerve deficit.      Sensory: No sensory deficit.      Motor: No abnormal muscle tone.      Coordination: Coordination normal.   Psychiatric:         Behavior: Behavior normal.         Thought Content: Thought content normal.         Judgment: Judgment normal.       Results Review:     Results from last 7 days   Lab Units 04/13/21  2319   SODIUM mmol/L 138   POTASSIUM mmol/L 3.3*   CHLORIDE mmol/L 101   CO2 mmol/L 17.0*   BUN mg/dL 20   CREATININE mg/dL 1.41*   GLUCOSE mg/dL 220*   CALCIUM mg/dL 11.1*   BILIRUBIN mg/dL 0.8   ALK PHOS U/L 120*   ALT (SGPT) U/L 31   AST (SGOT) U/L 24       Results from last 7 days   Lab Units 04/13/21  2319   MAGNESIUM mg/dL 1.9       Results from last 7 days   Lab Units 04/13/21  2319   WBC 10*3/mm3 23.08*   HEMOGLOBIN g/dL 18.0*   HEMATOCRIT % 50.2   PLATELETS 10*3/mm3 388           Imaging Results (Last 7 Days)     Procedure Component Value Units Date/Time    CT Abdomen Pelvis With Contrast [[id number]] Collected: 04/14/21 0018     Updated: 04/14/21 0048    Narrative:      CT ABDOMEN PELVIS WITH IV CONTRAST    INDICATION: 36 years Male; abdominal pain    TECHNIQUE:  CT scan of the abdomen and pelvis was performed with  IV contrast.  This exam was performed according to our  departmental dose-optimization program, which includes automated  exposure control, adjustment of the mA and/or kV according to  patient size and/or use of iterative reconstruction technique.    COMPARISON: None.    FINDINGS:  Liver: Unremarkable.  Gallbladder/Biliary tree: Unremarkable.  Pancreas: Unremarkable.  Spleen: Unremarkable.  Adrenals: Unremarkable.  Genitourinary: Unremarkable.  Gastrointestinal: Trace hiatal hernia. No gastric wall  thickening. No small bowel wall thickening or obstruction. The  colon is mildly distended with fluid without significant wall  thickening or surrounding edema/fat stranding. Appendix is normal  in caliber. No free air or free fluid.  Peritoneum:  Unremarkable.  Vasculature: Unremarkable.  Lymph nodes: No pathologically enlarged lymph nodes.  Bones: Unremarkable.  Soft tissues: Unremarkable.  Incidental findings: None.      Impression:      The colon is mildly distended with fluid without significant wall  thickening or surrounding edema/fat stranding. Recommend  correlation with gastroenteritis.    Electronically signed by:  Annmarie Simpson  4/14/2021 12:47 AM CDT  Workstation: [id number]          Assessment / Plan       Hospital Problem List:  Principal Problem:    Sepsis (CMS/[facility])  Active Problems:    Gastroenteritis  Acute kidney injury  Syncope  Depression  Obesity    Plan  -Patient has sepsis likely secondary to gastroenteritis along with acute kidney injury active and severe nausea and vomiting and diarrhea  -We will aggressively rehydrate patient with IV Ringer's lactate at 150 cc an hour  -Continue IV antibiotics with Zosyn  -Blood cultures  -Send stool PCR panel and stool for C. difficile  -Monitor renal function closely  -Echocardiogram to investigate syncopal episode.  Patient likely had a vasovagal episode or orthostatic hypotension due to severe diarrhea and volume loss  -Monitor on telemetry  -DVT prophylaxis with subcutaneous heparin  -CODE STATUS is full code    I discussed the patient's findings and my recommendations with patient and his father.    Omid Villarreal MD  04/14/21  04:21 CDT        Part of this note may be an electronic transcription/translation of spoken language to printed text using the Dragon Dictation System.

## 2021-04-14 NOTE — PROGRESS NOTES
[facility] Medicine Services  INPATIENT PROGRESS NOTE    Length of Stay: 0  Date of Admission: 4/13/2021  Primary Care Physician: Kelsey Sanchez APRN    Subjective   Chief Complaint: nausea, vomiting, diarrhea   HPI:  36 year old  male with past medical history of depression who presented on 4/13/2021 with complaints of abdominal cramps, nausea, vomiting, diarrhea.  He is currently admitted for suspected gastroenteritis and acute kidney injury from hypovolemia.  During today's visit, he reports diarrhea has improved and he has had no vomiting so far this morning.  He reports nausea continues.     Review of Systems   Constitutional: Positive for activity change and appetite change. Negative for chills, fatigue and fever.   HENT: Negative for congestion, rhinorrhea and sore throat.    Respiratory: Negative for cough, shortness of breath and wheezing.    Cardiovascular: Negative for chest pain, palpitations and leg swelling.   Gastrointestinal: Positive for nausea. Negative for abdominal pain, diarrhea and vomiting.   Musculoskeletal: Negative for back pain and neck pain.   Skin: Negative for pallor.   Neurological: Negative for dizziness, weakness and light-headedness.   Psychiatric/Behavioral: Negative for confusion. The patient is not nervous/anxious.         All pertinent negatives and positives are as above. All other systems have been reviewed and are negative unless otherwise stated.     Objective    Temp:  [95.3 °F (35.2 °C)-98.5 °F (36.9 °C)] 98.5 °F (36.9 °C)  Heart Rate:  [78-97] 90  Resp:  [17-22] 18  BP: ()/(55-79) 114/66    Physical Exam  Vitals and nursing note reviewed.   Constitutional:       General: He is not in acute distress.     Appearance: Normal appearance.   HENT:      Head: Normocephalic and atraumatic.      Right Ear: External ear normal.      Left Ear: External ear normal.      Nose: Nose normal.      Mouth/Throat:      Mouth:  Mucous membranes are moist.      Pharynx: Oropharynx is clear.   Eyes:      General: No scleral icterus.        Right eye: No discharge.         Left eye: No discharge.      Conjunctiva/sclera: Conjunctivae normal.   Cardiovascular:      Rate and Rhythm: Normal rate and regular rhythm.      Pulses: Normal pulses.      Heart sounds: Normal heart sounds. No murmur heard.   No friction rub. No gallop.    Pulmonary:      Effort: Pulmonary effort is normal. No respiratory distress.      Breath sounds: Normal breath sounds. No stridor. No wheezing, rhonchi or rales.   Abdominal:      General: Bowel sounds are normal. There is no distension.      Palpations: Abdomen is soft.      Tenderness: There is no abdominal tenderness.   Musculoskeletal:         General: No swelling. Normal range of motion.      Cervical back: Normal range of motion and neck supple.   Skin:     General: Skin is warm and dry.   Neurological:      General: No focal deficit present.      Mental Status: He is alert and oriented to person, place, and time.   Psychiatric:         Mood and Affect: Mood normal.         Behavior: Behavior normal.             Results Review:  I have reviewed the labs, radiology results, and diagnostic studies.    Laboratory Data:   Results from last 7 days   Lab Units 04/14/21  0401 04/13/21  2319   SODIUM mmol/L 139 138   POTASSIUM mmol/L 3.9 3.3*   CHLORIDE mmol/L 107 101   CO2 mmol/L 21.0* 17.0*   BUN mg/dL 18 20   CREATININE mg/dL 1.06 1.41*   GLUCOSE mg/dL 135* 220*   CALCIUM mg/dL 8.4* 11.1*   BILIRUBIN mg/dL  --  0.8   ALK PHOS U/L  --  120*   ALT (SGPT) U/L  --  31   AST (SGOT) U/L  --  24   ANION GAP mmol/L 11.0 20.0*     Estimated Creatinine Clearance: 95.6 mL/min (by C-G formula based on SCr of 1.06 mg/dL).  Results from last 7 days   Lab Units 04/13/21  2319   MAGNESIUM mg/dL 1.9         Results from last 7 days   Lab Units 04/14/21  0401 04/13/21  2319   WBC 10*3/mm3 17.58* 23.08*   HEMOGLOBIN g/dL 14.8 18.0*      HEMATOCRIT % 41.6 50.2   PLATELETS 10*3/mm3 278 388           Culture Data:   No results found for: BLOODCX  No results found for: URINECX  No results found for: RESPCX  No results found for: WOUNDCX  No results found for: STOOLCX  No components found for: BODYFLD    Radiology Data:   Imaging Results (Last 24 Hours)     Procedure Component Value Units Date/Time    CT Abdomen Pelvis With Contrast [[id number]] Collected: 04/14/21 0018     Updated: 04/14/21 0048    Narrative:      CT ABDOMEN PELVIS WITH IV CONTRAST    INDICATION: 36 years Male; abdominal pain    TECHNIQUE:  CT scan of the abdomen and pelvis was performed with  IV contrast.  This exam was performed according to our  departmental dose-optimization program, which includes automated  exposure control, adjustment of the mA and/or kV according to  patient size and/or use of iterative reconstruction technique.    COMPARISON: None.    FINDINGS:  Liver: Unremarkable.  Gallbladder/Biliary tree: Unremarkable.  Pancreas: Unremarkable.  Spleen: Unremarkable.  Adrenals: Unremarkable.  Genitourinary: Unremarkable.  Gastrointestinal: Trace hiatal hernia. No gastric wall  thickening. No small bowel wall thickening or obstruction. The  colon is mildly distended with fluid without significant wall  thickening or surrounding edema/fat stranding. Appendix is normal  in caliber. No free air or free fluid.  Peritoneum: Unremarkable.  Vasculature: Unremarkable.  Lymph nodes: No pathologically enlarged lymph nodes.  Bones: Unremarkable.  Soft tissues: Unremarkable.  Incidental findings: None.      Impression:      The colon is mildly distended with fluid without significant wall  thickening or surrounding edema/fat stranding. Recommend  correlation with gastroenteritis.    Electronically signed by:  Annmarie Simpson  4/14/2021 12:47 AM CDT  Workstation: [id number]          I have reviewed the patient's current medications.     Assessment/Plan     Active Hospital Problems     Diagnosis    • **Sepsis (CMS/[facility])    • Gastroenteritis        Plan:    1. Sepsis: improving.  Lactic acidosis resolved.  WBC trending downward from 23.08-17.58.  UA unremarkable.  Blood culture pending.  Continue Zosyn.   2. Gastroenteritis: continue Zosyn, IV hydration, IV antiemetics.  Clear liquid diet, advance as tolerated.  GI panel ordered but patient unable to provide a specimen as diarrhea has improved.   3. Acute kidney injury: resolved.       Discharge Planning: I expect patient to be discharged to home in 1 days.          This document has been electronically signed by VIJAYA Fuller on April 14, 2021 13:38 CDT

## 2021-04-14 NOTE — ED PROVIDER NOTES
Subjective   36-year-old white male presents to the emergency department chief complaint of abdominal pain.  Patient complains of generalized abdominal pain with nausea vomiting and diarrhea that started this evening.  Patient was sitting on the toilet and had a syncopal episode.  Patient relates he noted blood in his stool yesterday but no blood in his stool today.          Review of Systems   Constitutional: Positive for chills and diaphoresis. Negative for fever.   Respiratory: Negative for cough and shortness of breath.    Cardiovascular: Negative for chest pain and leg swelling.   Gastrointestinal: Positive for abdominal pain, blood in stool, diarrhea, nausea and vomiting.   Genitourinary: Negative for dysuria.   Musculoskeletal: Negative for back pain, neck pain and neck stiffness.   Neurological: Positive for syncope. Negative for seizures, weakness and headaches.   All other systems reviewed and are negative.      Past Medical History:   Diagnosis Date   • Depression        No Known Allergies    History reviewed. No pertinent surgical history.    Family History   Problem Relation Age of Onset   • No Known Problems Mother    • Hypertension Father        Social History     Socioeconomic History   • Marital status: Single     Spouse name: Not on file   • Number of children: Not on file   • Years of education: Not on file   • Highest education level: Not on file   Tobacco Use   • Smoking status: Never Smoker   • Smokeless tobacco: Never Used   Substance and Sexual Activity   • Alcohol use: Yes     Comment: on weekends    • Drug use: No           Objective   Physical Exam  Vitals and nursing note reviewed.   Constitutional:       General: He is not in acute distress.     Appearance: He is not toxic-appearing or diaphoretic.   HENT:      Head: Normocephalic and atraumatic.      Right Ear: External ear normal.      Left Ear: External ear normal.      Nose: Nose normal.      Mouth/Throat:      Mouth: Mucous  membranes are moist.      Pharynx: Oropharynx is clear.   Eyes:      Extraocular Movements: Extraocular movements intact.      Conjunctiva/sclera: Conjunctivae normal.      Pupils: Pupils are equal, round, and reactive to light.   Cardiovascular:      Rate and Rhythm: Normal rate and regular rhythm.      Pulses: Normal pulses.      Heart sounds: Normal heart sounds.   Pulmonary:      Effort: Pulmonary effort is normal.      Breath sounds: Normal breath sounds.   Abdominal:      General: Bowel sounds are normal. There is no distension.      Palpations: Abdomen is soft. There is no mass.      Tenderness: There is generalized abdominal tenderness. There is no guarding or rebound.   Musculoskeletal:         General: Normal range of motion.      Cervical back: Normal range of motion and neck supple.      Right lower leg: No edema.      Left lower leg: No edema.   Skin:     General: Skin is warm and dry.   Neurological:      General: No focal deficit present.      Mental Status: He is alert and oriented to person, place, and time.   Psychiatric:         Mood and Affect: Mood normal.         Behavior: Behavior normal.         ECG 12 Lead      Date/Time: 4/13/2021 11:38 PM  Performed by: Douglas Mathew MD  Authorized by: Douglas Mathew MD   Interpreted by physician  Rhythm: sinus rhythm  Rate: normal  BPM: 72  QRS axis: normal  Conduction: conduction normal  ST Segments: ST segments normal  T Waves: T waves normal  Clinical impression: normal ECG                 ED Course  ED Course as of Apr 14 0137 Wed Apr 14, 2021   0133 Patient is alert and resting comfortably.  I reviewed the results of his evaluation with him and recommended admission for observation.  I paged the hospitalist.    []   0135 Case discussed with Dr. Villarreal.  He agrees to admit the patient to telemetry.    [DR]      ED Course User Index  [DR] Douglas Mathew MD            Labs Reviewed   COMPREHENSIVE METABOLIC PANEL - Abnormal; Notable for the following  components:       Result Value    Glucose 220 (*)     Creatinine 1.41 (*)     Potassium 3.3 (*)     CO2 17.0 (*)     Calcium 11.1 (*)     Total Protein 9.5 (*)     Albumin 5.30 (*)     Alkaline Phosphatase 120 (*)     eGFR Non African Amer 57 (*)     Anion Gap 20.0 (*)     All other components within normal limits    Narrative:     GFR Normal >60  Chronic Kidney Disease <60  Kidney Failure <15     CBC WITH AUTO DIFFERENTIAL - Abnormal; Notable for the following components:    WBC 23.08 (*)     Hemoglobin 18.0 (*)     MCHC 35.9 (*)     RDW 11.9 (*)     Neutrophil % 85.3 (*)     Lymphocyte % 4.3 (*)     Immature Grans % 0.9 (*)     Neutrophils, Absolute 19.70 (*)     Monocytes, Absolute 1.64 (*)     Eosinophils, Absolute 0.43 (*)     Immature Grans, Absolute 0.21 (*)     All other components within normal limits   LACTIC ACID, PLASMA - Abnormal; Notable for the following components:    Lactate 2.2 (*)     All other components within normal limits   LIPASE - Normal   TROPONIN (IN-HOUSE) - Normal    Narrative:     Troponin T Reference Range:  <= 0.03 ng/mL-   Negative for AMI  >0.03 ng/mL-     Abnormal for myocardial necrosis.  Clinicians would have to utilize clinical acumen, EKG, Troponin and serial changes to determine if it is an Acute Myocardial Infarction or myocardial injury due to an underlying chronic condition.       Results may be falsely decreased if patient taking Biotin.     BLOOD CULTURE   BLOOD CULTURE   RAINBOW DRAW    Narrative:     The following orders were created for panel order [place] Draw.  Procedure                               Abnormality         Status                     ---------                               -----------         ------                     Light Blue Top[[id number]]                                   Final result               Green Top (Gel)[[id number]]                                                             Lavender Top[[id number]]                                     Final  result               [facility][[id number]]                                   Final result                 Please view results for these tests on the individual orders.   URINALYSIS W/ MICROSCOPIC IF INDICATED (NO CULTURE)   LACTIC ACID, REFLEX   CBC AND DIFFERENTIAL    Narrative:     The following orders were created for panel order CBC & Differential.  Procedure                               Abnormality         Status                     ---------                               -----------         ------                     CBC Auto Differential[[id number]]        Abnormal            Final result                 Please view results for these tests on the individual orders.   LIGHT BLUE TOP   LAVENDER TOP   GOLD TOP - SST   GREEN TOP     CT Abdomen Pelvis With Contrast    Result Date: 4/14/2021  Narrative: CT ABDOMEN PELVIS WITH IV CONTRAST INDICATION: 36 years Male; abdominal pain TECHNIQUE:  CT scan of the abdomen and pelvis was performed with IV contrast.  This exam was performed according to our departmental dose-optimization program, which includes automated exposure control, adjustment of the mA and/or kV according to patient size and/or use of iterative reconstruction technique. COMPARISON: None. FINDINGS: Liver: Unremarkable. Gallbladder/Biliary tree: Unremarkable. Pancreas: Unremarkable. Spleen: Unremarkable. Adrenals: Unremarkable. Genitourinary: Unremarkable. Gastrointestinal: Trace hiatal hernia. No gastric wall thickening. No small bowel wall thickening or obstruction. The colon is mildly distended with fluid without significant wall thickening or surrounding edema/fat stranding. Appendix is normal in caliber. No free air or free fluid. Peritoneum: Unremarkable. Vasculature: Unremarkable. Lymph nodes: No pathologically enlarged lymph nodes. Bones: Unremarkable. Soft tissues: Unremarkable. Incidental findings: None.     Impression: The colon is mildly distended with fluid without significant wall  thickening or surrounding edema/fat stranding. Recommend correlation with gastroenteritis. Electronically signed by:  Annmarie Simpson  4/14/2021 12:47 AM CDT Workstation: [id number]                                    [facility]    Final diagnoses:   Sepsis, due to unspecified organism, unspecified whether acute organ dysfunction present (CMS/[facility])       ED Disposition  ED Disposition     ED Disposition Condition Comment    Decision to Admit  Level of Care: Telemetry [5]   Diagnosis: Sepsis, due to unspecified organism, unspecified whether acute organ dysfunction present (CMS/[facility]) [[id number]]   Admitting Physician: AMADEO OATES [[id number]]   Attending Physician: AMADEO OATES [[id number]]            No follow-up provider specified.       Medication List      No changes were made to your prescriptions during this visit.          Douglas Mathew MD  04/14/21 0137

## 2021-04-14 NOTE — PLAN OF CARE
Goal Outcome Evaluation:  Plan of Care Reviewed With: patient  Progress: improving  Outcome Summary: Vss, denies pain, no N/V, will cont to monitor.

## 2021-04-14 NOTE — ED NOTES
Patient informed of need for stool sample.  Patient could not give one at this time.     Ursula Brewer RN  04/14/21 0231

## 2021-04-15 ENCOUNTER — READMISSION MANAGEMENT (OUTPATIENT)
Dept: CALL CENTER | Facility: HOSPITAL | Age: 36
End: 2021-04-15

## 2021-04-15 VITALS
TEMPERATURE: 98.2 F | DIASTOLIC BLOOD PRESSURE: 60 MMHG | BODY MASS INDEX: 29.97 KG/M2 | HEIGHT: 65 IN | RESPIRATION RATE: 18 BRPM | SYSTOLIC BLOOD PRESSURE: 116 MMHG | WEIGHT: 179.9 LBS | OXYGEN SATURATION: 94 % | HEART RATE: 66 BPM

## 2021-04-15 LAB
ANION GAP SERPL CALCULATED.3IONS-SCNC: 6 MMOL/L (ref 5–15)
BASOPHILS # BLD AUTO: 0.04 10*3/MM3 (ref 0–0.2)
BASOPHILS NFR BLD AUTO: 0.5 % (ref 0–1.5)
BUN SERPL-MCNC: 10 MG/DL (ref 6–20)
BUN/CREAT SERPL: 9.8 (ref 7–25)
CALCIUM SPEC-SCNC: 8.8 MG/DL (ref 8.6–10.5)
CHLORIDE SERPL-SCNC: 108 MMOL/L (ref 98–107)
CO2 SERPL-SCNC: 24 MMOL/L (ref 22–29)
CREAT SERPL-MCNC: 1.02 MG/DL (ref 0.76–1.27)
DEPRECATED RDW RBC AUTO: 39.4 FL (ref 37–54)
EOSINOPHIL # BLD AUTO: 0.39 10*3/MM3 (ref 0–0.4)
EOSINOPHIL NFR BLD AUTO: 4.6 % (ref 0.3–6.2)
ERYTHROCYTE [DISTWIDTH] IN BLOOD BY AUTOMATED COUNT: 12 % (ref 12.3–15.4)
GFR SERPL CREATININE-BSD FRML MDRD: 83 ML/MIN/1.73
GLUCOSE SERPL-MCNC: 94 MG/DL (ref 65–99)
HCT VFR BLD AUTO: 38.9 % (ref 37.5–51)
HGB BLD-MCNC: 13.4 G/DL (ref 13–17.7)
IMM GRANULOCYTES # BLD AUTO: 0.03 10*3/MM3 (ref 0–0.05)
IMM GRANULOCYTES NFR BLD AUTO: 0.4 % (ref 0–0.5)
LYMPHOCYTES # BLD AUTO: 1.78 10*3/MM3 (ref 0.7–3.1)
LYMPHOCYTES NFR BLD AUTO: 20.8 % (ref 19.6–45.3)
MCH RBC QN AUTO: 30.9 PG (ref 26.6–33)
MCHC RBC AUTO-ENTMCNC: 34.4 G/DL (ref 31.5–35.7)
MCV RBC AUTO: 89.8 FL (ref 79–97)
MONOCYTES # BLD AUTO: 1.25 10*3/MM3 (ref 0.1–0.9)
MONOCYTES NFR BLD AUTO: 14.6 % (ref 5–12)
NEUTROPHILS NFR BLD AUTO: 5.07 10*3/MM3 (ref 1.7–7)
NEUTROPHILS NFR BLD AUTO: 59.1 % (ref 42.7–76)
NRBC BLD AUTO-RTO: 0 /100 WBC (ref 0–0.2)
PLATELET # BLD AUTO: 232 10*3/MM3 (ref 140–450)
PMV BLD AUTO: 8.9 FL (ref 6–12)
POTASSIUM SERPL-SCNC: 3.5 MMOL/L (ref 3.5–5.2)
QT INTERVAL: 386 MS
QTC INTERVAL: 422 MS
RBC # BLD AUTO: 4.33 10*6/MM3 (ref 4.14–5.8)
SODIUM SERPL-SCNC: 138 MMOL/L (ref 136–145)
WBC # BLD AUTO: 8.56 10*3/MM3 (ref 3.4–10.8)

## 2021-04-15 PROCEDURE — 25010000002 PIPERACILLIN SOD-TAZOBACTAM PER 1 G: Performed by: INTERNAL MEDICINE

## 2021-04-15 PROCEDURE — G0378 HOSPITAL OBSERVATION PER HR: HCPCS

## 2021-04-15 PROCEDURE — 80048 BASIC METABOLIC PNL TOTAL CA: CPT | Performed by: INTERNAL MEDICINE

## 2021-04-15 PROCEDURE — 96372 THER/PROPH/DIAG INJ SC/IM: CPT

## 2021-04-15 PROCEDURE — 36415 COLL VENOUS BLD VENIPUNCTURE: CPT | Performed by: INTERNAL MEDICINE

## 2021-04-15 PROCEDURE — 85025 COMPLETE CBC W/AUTO DIFF WBC: CPT | Performed by: INTERNAL MEDICINE

## 2021-04-15 PROCEDURE — 25010000002 HEPARIN (PORCINE) PER 1000 UNITS: Performed by: INTERNAL MEDICINE

## 2021-04-15 PROCEDURE — 96366 THER/PROPH/DIAG IV INF ADDON: CPT

## 2021-04-15 PROCEDURE — 96361 HYDRATE IV INFUSION ADD-ON: CPT

## 2021-04-15 RX ORDER — ONDANSETRON 4 MG/1
4 TABLET, FILM COATED ORAL EVERY 8 HOURS PRN
Qty: 20 TABLET | Refills: 0 | Status: SHIPPED | OUTPATIENT
Start: 2021-04-15 | End: 2021-04-22

## 2021-04-15 RX ADMIN — PIPERACILLIN SODIUM AND TAZOBACTAM SODIUM 3.38 G: 3; .375 INJECTION, POWDER, LYOPHILIZED, FOR SOLUTION INTRAVENOUS at 01:28

## 2021-04-15 RX ADMIN — FLUTICASONE PROPIONATE 1 SPRAY: 50 SPRAY, METERED NASAL at 09:52

## 2021-04-15 RX ADMIN — SODIUM CHLORIDE, POTASSIUM CHLORIDE, SODIUM LACTATE AND CALCIUM CHLORIDE 150 ML/HR: 600; 310; 30; 20 INJECTION, SOLUTION INTRAVENOUS at 08:14

## 2021-04-15 RX ADMIN — SERTRALINE 100 MG: 50 TABLET, FILM COATED ORAL at 08:13

## 2021-04-15 RX ADMIN — HEPARIN SODIUM 5000 UNITS: 5000 INJECTION INTRAVENOUS; SUBCUTANEOUS at 05:35

## 2021-04-15 RX ADMIN — PIPERACILLIN SODIUM AND TAZOBACTAM SODIUM 3.38 G: 3; .375 INJECTION, POWDER, LYOPHILIZED, FOR SOLUTION INTRAVENOUS at 09:56

## 2021-04-15 NOTE — OUTREACH NOTE
Prep Survey      Responses   [facility], [place], operated by [facility] patient discharged from?  [facility]   Is LACE score < 7 ?  Yes   Emergency Room discharge w/ pulse ox?  No   Eligibility  [facility]   Date of Admission  04/13/21   Date of Discharge  04/15/21   Discharge Disposition  Home or Self Care   Discharge diagnosis  Gastroenteritis, Sepsis   Does the patient have one of the following disease processes/diagnoses(primary or secondary)?  Sepsis   Does the patient have Home health ordered?  No   Is there a DME ordered?  No   Prep survey completed?  Yes          Maria Teresa Mistry RN

## 2021-04-15 NOTE — PLAN OF CARE
Problem: Adult Inpatient Plan of Care  Goal: Plan of Care Review  Outcome: Ongoing, Progressing  Flowsheets  Taken 4/15/2021 0117 by Janelle Braswell RN  Progress: improving  Outcome Summary: pt vs stable no vomiting ovenright pt positive for norovirus no s/s of distress at this time.  Taken 4/14/2021 1546 by Deana Schwarz RN  Plan of Care Reviewed With: patient     Problem: Adult Inpatient Plan of Care  Goal: Patient-Specific Goal (Individualized)  Outcome: Ongoing, Progressing     Problem: Adult Inpatient Plan of Care  Goal: Absence of Hospital-Acquired Illness or Injury  Outcome: Ongoing, Progressing     Problem: Adult Inpatient Plan of Care  Goal: Absence of Hospital-Acquired Illness or Injury  Intervention: Identify and Manage Fall Risk  Recent Flowsheet Documentation  Taken 4/15/2021 0116 by Janelle Braswell RN  Safety Promotion/Fall Prevention:   safety round/check completed   nonskid shoes/slippers when out of bed  Taken 4/14/2021 2325 by Janelle Braswell RN  Safety Promotion/Fall Prevention:   safety round/check completed   nonskid shoes/slippers when out of bed  Taken 4/14/2021 2123 by Janelle Braswell RN  Safety Promotion/Fall Prevention:   safety round/check completed   nonskid shoes/slippers when out of bed  Taken 4/14/2021 1923 by Janelle Braswell RN  Safety Promotion/Fall Prevention:   safety round/check completed   nonskid shoes/slippers when out of bed     Problem: Adult Inpatient Plan of Care  Goal: Absence of Hospital-Acquired Illness or Injury  Intervention: Prevent and Manage VTE (venous thromboembolism) Risk  Recent Flowsheet Documentation  Taken 4/14/2021 1923 by Janelle Braswell RN  VTE Prevention/Management: (heparin) other (see comments)     Problem: Adult Inpatient Plan of Care  Goal: Optimal Comfort and Wellbeing  Outcome: Ongoing, Progressing     Problem: Adult Inpatient Plan of Care  Goal: Optimal Comfort and Wellbeing  Intervention: Provide Person-Centered  Care  Recent Flowsheet Documentation  Taken 4/14/2021 1923 by Janelle Braswell RN  Trust Relationship/Rapport:   care explained   questions answered   thoughts/feelings acknowledged     Problem: Adult Inpatient Plan of Care  Goal: Readiness for Transition of Care  Outcome: Ongoing, Progressing     Problem: Adjustment to Illness (Sepsis/Septic Shock)  Goal: Optimal Coping  Outcome: Ongoing, Progressing     Problem: Bleeding (Sepsis/Septic Shock)  Goal: Absence of Bleeding  Outcome: Ongoing, Progressing     Problem: Glycemic Control Impaired (Sepsis/Septic Shock)  Goal: Blood Glucose Level Within Desired Range  Outcome: Ongoing, Progressing     Problem: Hemodynamic Instability (Sepsis/Septic Shock)  Goal: Effective Tissue Perfusion  Outcome: Ongoing, Progressing     Problem: Infection (Sepsis/Septic Shock)  Goal: Absence of Infection Signs and Symptoms  Outcome: Ongoing, Progressing     Problem: Nutrition Impaired (Sepsis/Septic Shock)  Goal: Optimal Nutrition Intake  Outcome: Ongoing, Progressing     Problem: Respiratory Compromise (Sepsis/Septic Shock)  Goal: Effective Oxygenation and Ventilation  Outcome: Ongoing, Progressing     Problem: Respiratory Compromise (Sepsis/Septic Shock)  Goal: Effective Oxygenation and Ventilation  Intervention: Promote Airway Secretion Clearance  Recent Flowsheet Documentation  Taken 4/14/2021 1923 by Janelle Braswell, RN  Activity Management: activity adjusted per tolerance  Cough And Deep Breathing: done independently per patient     Problem: Diarrhea (Gastroenteritis)  Goal: Effective Diarrhea Management  Outcome: Ongoing, Progressing     Problem: Fluid Imbalance (Gastroenteritis)  Goal: Fluid Balance  Outcome: Ongoing, Progressing     Problem: Nausea and Vomiting (Gastroenteritis)  Goal: Nausea and Vomiting Relief  Outcome: Ongoing, Progressing   Goal Outcome Evaluation:     Progress: improving  Outcome Summary: pt vs stable no vomiting ovenright pt positive for norovirus no  s/s of distress at this time.

## 2021-04-15 NOTE — DISCHARGE SUMMARY
[facility] Medicine Services  DISCHARGE SUMMARY       Date of Admission: 4/13/2021  Date of Discharge:  4/15/2021  Primary Care Physician: Kelsey Sanchez APRN    Presenting Problem/History of Present Illness:  Sepsis, due to unspecified organism, unspecified whether acute organ dysfunction present (CMS/[facility]) [A41.9]       Final Discharge Diagnoses:  Active Hospital Problems    Diagnosis    • **Sepsis (CMS/[facility])    • Gastroenteritis        Consults:   Consults     No orders found from 3/15/2021 to 4/14/2021.          Procedures Performed:                 Pertinent Test Results:   Lab Results (most recent)     Procedure Component Value Units Date/Time    Basic Metabolic Panel [[id number]]  (Abnormal) Collected: 04/15/21 0557    Specimen: Blood Updated: 04/15/21 0701     Glucose 94 mg/dL      BUN 10 mg/dL      Creatinine 1.02 mg/dL      Sodium 138 mmol/L      Potassium 3.5 mmol/L      Chloride 108 mmol/L      CO2 24.0 mmol/L      Calcium 8.8 mg/dL      eGFR Non African Amer 83 mL/min/1.73      BUN/Creatinine Ratio 9.8     Anion Gap 6.0 mmol/L     Narrative:      GFR Normal >60  Chronic Kidney Disease <60  Kidney Failure <15      CBC & Differential [[id number]]  (Abnormal) Collected: 04/15/21 0557    Specimen: Blood Updated: 04/15/21 0648    Narrative:      The following orders were created for panel order CBC & Differential.  Procedure                               Abnormality         Status                     ---------                               -----------         ------                     CBC Auto Differential[[id number]]        Abnormal            Final result                 Please view results for these tests on the individual orders.    CBC Auto Differential [[id number]]  (Abnormal) Collected: 04/15/21 0557    Specimen: Blood Updated: 04/15/21 0648     WBC 8.56 10*3/mm3      RBC 4.33 10*6/mm3      Hemoglobin 13.4 g/dL      Hematocrit 38.9 %      MCV 89.8 fL      MCH  30.9 pg      MCHC 34.4 g/dL      RDW 12.0 %      RDW-SD 39.4 fl      MPV 8.9 fL      Platelets 232 10*3/mm3      Neutrophil % 59.1 %      Lymphocyte % 20.8 %      Monocyte % 14.6 %      Eosinophil % 4.6 %      Basophil % 0.5 %      Immature Grans % 0.4 %      Neutrophils, Absolute 5.07 10*3/mm3      Lymphocytes, Absolute 1.78 10*3/mm3      Monocytes, Absolute 1.25 10*3/mm3      Eosinophils, Absolute 0.39 10*3/mm3      Basophils, Absolute 0.04 10*3/mm3      Immature Grans, Absolute 0.03 10*3/mm3      nRBC 0.0 /100 WBC     Blood Culture - Blood, Arm, Left [[id number]] Collected: 04/14/21 0110    Specimen: Blood from Arm, Left Updated: 04/15/21 0130     Blood Culture No growth at 24 hours    Blood Culture - Blood, Arm, Right [[id number]] Collected: 04/14/21 0052    Specimen: Blood from Arm, Right Updated: 04/15/21 0100     Blood Culture No growth at 24 hours    Gastrointestinal Panel, PCR - Stool, Per Rectum [[id number]]  (Abnormal) Collected: 04/14/21 1824    Specimen: Stool from Per Rectum Updated: 04/14/21 2102     Campylobacter Not Detected     Plesiomonas shigelloides Not Detected     Salmonella Not Detected     Vibrio Not Detected     Vibrio cholerae Not Detected     Yersinia enterocolitica Not Detected     Enteroaggregative E. coli (EAEC) Not Detected     Enteropathogenic E. coli (EPEC) Not Detected     Enterotoxigenic E. coli (ETEC) lt/st Not Detected     Shiga-like toxin-producing E. coli (STEC) stx1/stx2 Not Detected     Shigella/Enteroinvasive E. coli (EIEC) Not Detected     Cryptosporidium Not Detected     Cyclospora cayetanensis Not Detected     Entamoeba histolytica Not Detected     Giardia lamblia Not Detected     Adenovirus F40/41 Not Detected     Astrovirus Not Detected     Norovirus GI/GII Detected     Rotavirus A Not Detected     Sapovirus (I, II, IV or V) Not Detected    Narrative:      Testing performed by multiplex PCR system.    Clostridium Difficile Toxin - Stool, Per Rectum [[id number]]  (Normal)  Collected: 04/14/21 1824    Specimen: Stool from Per Rectum Updated: 04/14/21 2004    Narrative:      The following orders were created for panel order Clostridium Difficile Toxin - Stool, Per Rectum.  Procedure                               Abnormality         Status                     ---------                               -----------         ------                     Clostridium Difficile To...[[id number]]  Normal              Final result                 Please view results for these tests on the individual orders.    Clostridium Difficile Toxin, PCR - Stool, Per Rectum [[id number]]  (Normal) Collected: 04/14/21 1824    Specimen: Stool from Per Rectum Updated: 04/14/21 2004     C. Difficile Toxins by PCR Negative    Narrative:      Performed by real-time polymerase chain reaction (qPCR).    [place] Draw [[id number]] Collected: 04/13/21 2319    Specimen: Blood Updated: 04/14/21 0558    Narrative:      The following orders were created for panel order [place] Draw.  Procedure                               Abnormality         Status                     ---------                               -----------         ------                     Light Blue Top[[id number]]                                   Final result               Green Top (Gel)[[id number]]                                                             Lavender Top[[id number]]                                     Final result               Gold Top - SST[[id number]]                                   Final result                 Please view results for these tests on the individual orders.    Basic Metabolic Panel [[id number]]  (Abnormal) Collected: 04/14/21 0401    Specimen: Blood Updated: 04/14/21 0515     Glucose 135 mg/dL      BUN 18 mg/dL      Creatinine 1.06 mg/dL      Sodium 139 mmol/L      Potassium 3.9 mmol/L      Chloride 107 mmol/L      CO2 21.0 mmol/L      Calcium 8.4 mg/dL      eGFR Non African Amer 80 mL/min/1.73      BUN/Creatinine Ratio 17.0      Anion Gap 11.0 mmol/L     Narrative:      GFR Normal >60  Chronic Kidney Disease <60  Kidney Failure <15      CBC & Differential [[id number]]  (Abnormal) Collected: 04/14/21 0401    Specimen: Blood Updated: 04/14/21 0513    Narrative:      The following orders were created for panel order CBC & Differential.  Procedure                               Abnormality         Status                     ---------                               -----------         ------                     CBC Auto Differential[[id number]]        Abnormal            Final result                 Please view results for these tests on the individual orders.    CBC Auto Differential [[id number]]  (Abnormal) Collected: 04/14/21 0401    Specimen: Blood Updated: 04/14/21 0513     WBC 17.58 10*3/mm3      RBC 4.74 10*6/mm3      Hemoglobin 14.8 g/dL      Hematocrit 41.6 %      MCV 87.8 fL      MCH 31.2 pg      MCHC 35.6 g/dL      RDW 11.9 %      RDW-SD 38.0 fl      MPV 9.1 fL      Platelets 278 10*3/mm3      Neutrophil % 91.4 %      Lymphocyte % 1.9 %      Monocyte % 5.6 %      Eosinophil % 0.1 %      Basophil % 0.3 %      Immature Grans % 0.7 %      Neutrophils, Absolute 16.06 10*3/mm3      Lymphocytes, Absolute 0.34 10*3/mm3      Monocytes, Absolute 0.99 10*3/mm3      Eosinophils, Absolute 0.02 10*3/mm3      Basophils, Absolute 0.05 10*3/mm3      Immature Grans, Absolute 0.12 10*3/mm3      nRBC 0.0 /100 WBC     Timed Lactic Acid, Reflex [[id number]]  (Normal) Collected: 04/14/21 0401    Specimen: Blood Updated: 04/14/21 0512     Lactate 1.5 mmol/L     Magnesium [[id number]]  (Normal) Collected: 04/13/21 2319    Specimen: Blood Updated: 04/14/21 0417     Magnesium 1.9 mg/dL     COVID-19 and FLU A/B PCR - Swab, Nasopharynx [[id number]]  (Normal) Collected: 04/14/21 0139    Specimen: Swab from Nasopharynx Updated: 04/14/21 0349     COVID19 Not Detected     Influenza A PCR Not Detected     Influenza B PCR Not Detected    Narrative:      Fact sheet for  providers: https://www.fda.gov/media/265912/download    Fact sheet for patients: https://www.fda.gov/media/992346/download    Test performed by PCR.    Urinalysis, Microscopic Only - Urine, Clean Catch [[id number]]  (Abnormal) Collected: 04/14/21 0129    Specimen: Urine, Clean Catch Updated: 04/14/21 0151     RBC, UA 3-5 /HPF      WBC, UA 3-5 /HPF      Bacteria, UA None Seen /HPF      Squamous Epithelial Cells, UA None Seen /HPF      Hyaline Casts, UA 31-50 /LPF      Methodology Manual Light Microscopy    Urinalysis With Microscopic If Indicated (No Culture) - Urine, Clean Catch [[id number]]  (Abnormal) Collected: 04/14/21 0129    Specimen: Urine, Clean Catch Updated: 04/14/21 0145     Color, UA Yellow     Appearance, UA Clear     pH, UA <=5.0     Specific Gravity, UA 1.071     Glucose, UA Negative     Ketones, UA Negative     Bilirubin, UA Negative     Blood, UA Trace     Protein, UA Trace     Leuk Esterase, UA Negative     Nitrite, UA Negative     Urobilinogen, UA 0.2 E.U./dL    Lactic Acid, Plasma [[id number]]  (Abnormal) Collected: 04/14/21 0052    Specimen: Blood Updated: 04/14/21 0129     Lactate 2.2 mmol/L     Lavender Top [[id number]] Collected: 04/13/21 2319    Specimen: Blood Updated: 04/14/21 0030     Extra Tube hold for add-on     Comment: Auto resulted       Gold Top - SST [[id number]] Collected: 04/13/21 2319    Specimen: Blood Updated: 04/14/21 0030     Extra Tube Hold for add-ons.     Comment: Auto resulted.       Light Blue Top [[id number]] Collected: 04/13/21 2319    Specimen: Blood from Arm, Right Updated: 04/14/21 0030     Extra Tube hold for add-on     Comment: Auto resulted       Comprehensive Metabolic Panel [[id number]]  (Abnormal) Collected: 04/13/21 2319    Specimen: Blood Updated: 04/13/21 2351     Glucose 220 mg/dL      BUN 20 mg/dL      Creatinine 1.41 mg/dL      Sodium 138 mmol/L      Potassium 3.3 mmol/L      Chloride 101 mmol/L      CO2 17.0 mmol/L      Calcium 11.1 mg/dL      Total Protein  9.5 g/dL      Albumin 5.30 g/dL      ALT (SGPT) 31 U/L      AST (SGOT) 24 U/L      Alkaline Phosphatase 120 U/L      Total Bilirubin 0.8 mg/dL      eGFR Non African Amer 57 mL/min/1.73      Globulin 4.2 gm/dL      A/G Ratio 1.3 g/dL      BUN/Creatinine Ratio 14.2     Anion Gap 20.0 mmol/L     Narrative:      GFR Normal >60  Chronic Kidney Disease <60  Kidney Failure <15      Lipase [[id number]]  (Normal) Collected: 04/13/21 2319    Specimen: Blood Updated: 04/13/21 2351     Lipase 27 U/L     Troponin [[id number]]  (Normal) Collected: 04/13/21 2319    Specimen: Blood Updated: 04/13/21 2351     Troponin T <0.010 ng/mL     Narrative:      Troponin T Reference Range:  <= 0.03 ng/mL-   Negative for AMI  >0.03 ng/mL-     Abnormal for myocardial necrosis.  Clinicians would have to utilize clinical acumen, EKG, Troponin and serial changes to determine if it is an Acute Myocardial Infarction or myocardial injury due to an underlying chronic condition.       Results may be falsely decreased if patient taking Biotin.          Imaging Results (Most Recent)     Procedure Component Value Units Date/Time    CT Abdomen Pelvis With Contrast [[id number]] Collected: 04/14/21 0018     Updated: 04/14/21 0048    Narrative:      CT ABDOMEN PELVIS WITH IV CONTRAST    INDICATION: 36 years Male; abdominal pain    TECHNIQUE:  CT scan of the abdomen and pelvis was performed with  IV contrast.  This exam was performed according to our  departmental dose-optimization program, which includes automated  exposure control, adjustment of the mA and/or kV according to  patient size and/or use of iterative reconstruction technique.    COMPARISON: None.    FINDINGS:  Liver: Unremarkable.  Gallbladder/Biliary tree: Unremarkable.  Pancreas: Unremarkable.  Spleen: Unremarkable.  Adrenals: Unremarkable.  Genitourinary: Unremarkable.  Gastrointestinal: Trace hiatal hernia. No gastric wall  thickening. No small bowel wall thickening or obstruction. The  colon is  "mildly distended with fluid without significant wall  thickening or surrounding edema/fat stranding. Appendix is normal  in caliber. No free air or free fluid.  Peritoneum: Unremarkable.  Vasculature: Unremarkable.  Lymph nodes: No pathologically enlarged lymph nodes.  Bones: Unremarkable.  Soft tissues: Unremarkable.  Incidental findings: None.      Impression:      The colon is mildly distended with fluid without significant wall  thickening or surrounding edema/fat stranding. Recommend  correlation with gastroenteritis.    Electronically signed by:  Annmarie Simpson  4/14/2021 12:47 AM CDT  Workstation: [id number]          Chief Complaint on Day of Discharge: none   Hospital Course:  The patient is a 36 y.o. male who presented to [facility] with nausea, vomiting, diarrhea.  The patient was noted to have an elevated creatinine consistent with acute kidney injury.  This was felt to be secondary to volume loss from his excessive GI loss.  He was hydrated with IV fluids and placed on bowel rest.  Renal function improved.  Diet was increased and he tolerated.  GI panel but was positive for norovirus.    Condition on Discharge:  Stable    Physical Exam on Discharge:  /60 (BP Location: Left arm, Patient Position: Lying)   Pulse 66   Temp 98.2 °F (36.8 °C) (Temporal)   Resp 18   Ht 165.1 cm (65\")   Wt 81.6 kg (179 lb 14.3 oz)   SpO2 94%   BMI 29.94 kg/m²   Physical Exam  Constitutional:       Appearance: Normal appearance.   HENT:      Head: Normocephalic and atraumatic.      Right Ear: External ear normal.      Left Ear: External ear normal.      Nose: Nose normal.      Mouth/Throat:      Mouth: Mucous membranes are dry.   Eyes:      General: No scleral icterus.     Extraocular Movements: Extraocular movements intact.      Pupils: Pupils are equal, round, and reactive to light.   Cardiovascular:      Rate and Rhythm: Normal rate and regular rhythm.      Heart sounds: Normal heart sounds.   "   Pulmonary:      Effort: Pulmonary effort is normal. No respiratory distress.      Breath sounds: Normal breath sounds. No wheezing or rales.   Abdominal:      General: Abdomen is flat. Bowel sounds are normal.      Palpations: Abdomen is soft.      Tenderness: There is no abdominal tenderness. There is no guarding or rebound.   Musculoskeletal:      Cervical back: Normal range of motion and neck supple.      Right lower leg: No edema.      Left lower leg: No edema.   Lymphadenopathy:      Cervical: No cervical adenopathy.   Skin:     General: Skin is warm and dry.      Findings: No erythema or rash.   Neurological:      General: No focal deficit present.      Mental Status: He is alert and oriented to person, place, and time.      Cranial Nerves: No cranial nerve deficit.      Sensory: No sensory deficit.      Motor: No weakness.      Coordination: Coordination normal.   Psychiatric:         Mood and Affect: Mood normal.         Behavior: Behavior normal.           Discharge Disposition:  Home or Self Care    Discharge Medications:     Discharge Medications      New Medications      Instructions Start Date   ondansetron 4 MG tablet  Commonly known as: Zofran   4 mg, Oral, Every 8 Hours PRN         Continue These Medications      Instructions Start Date   fluticasone 50 MCG/ACT nasal spray  Commonly known as: FLONASE   1 spray, Nasal, 2 times daily      montelukast 10 MG tablet  Commonly known as: SINGULAIR   10 mg, Oral, Nightly      phentermine 37.5 MG tablet  Commonly known as: ADIPEX-P   37.5 mg, Oral, Every Morning Before Breakfast      sertraline 100 MG tablet  Commonly known as: Zoloft   100 mg, Oral, Daily         Stop These Medications    amoxicillin-clavulanate 875-125 MG per tablet  Commonly known as: Augmentin     predniSONE 10 MG tablet  Commonly known as: DELTASONE            Discharge Diet:   Diet Instructions     Diet: Peds - BRAT      Discharge Diet: Peds - BRAT          Activity at Discharge:    Activity Instructions     Activity as Tolerated            Discharge Care Plan/Instructions: Return if symptoms continue    Follow-up Appointments:   Future Appointments   Date Time Provider Department Center   4/22/2021 11:00 AM Kelsey Sanchez APRN [facility]       Test Results Pending at Discharge:   Pending Labs     Order Current Status    Blood Culture - Blood, Arm, Left Preliminary result    Blood Culture - Blood, Arm, Right Preliminary result          Rafita Grijalva MD    Time: 36 min

## 2021-04-16 ENCOUNTER — TRANSITIONAL CARE MANAGEMENT TELEPHONE ENCOUNTER (OUTPATIENT)
Dept: CALL CENTER | Facility: HOSPITAL | Age: 36
End: 2021-04-16

## 2021-04-16 NOTE — OUTREACH NOTE
Call Center TCM Note      Responses   [facility] patient discharged from?  [place]   Does the patient have one of the following disease processes/diagnoses(primary or secondary)?  Sepsis   TCM attempt successful?  No   Unsuccessful attempts  Attempt 2          Annette Michelle RN    4/16/2021, 15:44 CDT

## 2021-04-16 NOTE — OUTREACH NOTE
Call Center TCM Note      Responses   [facility] patient discharged from?  [place]   Does the patient have one of the following disease processes/diagnoses(primary or secondary)?  Sepsis   TCM attempt successful?  No [Michael-Girl friend]   Unsuccessful attempts  Attempt 1          Annette Michelle RN    4/16/2021, 15:34 CDT

## 2021-04-17 ENCOUNTER — TRANSITIONAL CARE MANAGEMENT TELEPHONE ENCOUNTER (OUTPATIENT)
Dept: CALL CENTER | Facility: HOSPITAL | Age: 36
End: 2021-04-17

## 2021-04-17 NOTE — OUTREACH NOTE
Call Center TCM Note      Responses   [facility] patient discharged from?  [place]   Does the patient have one of the following disease processes/diagnoses(primary or secondary)?  Sepsis   TCM attempt successful?  No [Michael]   Unsuccessful attempts  Attempt 3          Annette Michelle RN    4/17/2021, 10:31 EDT

## 2021-04-19 LAB
BACTERIA SPEC AEROBE CULT: NORMAL
BACTERIA SPEC AEROBE CULT: NORMAL

## 2021-04-22 ENCOUNTER — LAB (OUTPATIENT)
Dept: LAB | Facility: OTHER | Age: 36
End: 2021-04-22

## 2021-04-22 ENCOUNTER — OFFICE VISIT (OUTPATIENT)
Dept: FAMILY MEDICINE CLINIC | Facility: CLINIC | Age: 36
End: 2021-04-22

## 2021-04-22 VITALS — BODY MASS INDEX: 28.32 KG/M2 | HEIGHT: 65 IN | WEIGHT: 170 LBS

## 2021-04-22 DIAGNOSIS — N17.9 AKI (ACUTE KIDNEY INJURY) (HCC): ICD-10-CM

## 2021-04-22 DIAGNOSIS — A41.9 SEPSIS, DUE TO UNSPECIFIED ORGANISM, UNSPECIFIED WHETHER ACUTE ORGAN DYSFUNCTION PRESENT (HCC): ICD-10-CM

## 2021-04-22 DIAGNOSIS — K62.89 RECTAL PAIN: ICD-10-CM

## 2021-04-22 DIAGNOSIS — A08.4 GASTROENTERITIS AND COLITIS, VIRAL: ICD-10-CM

## 2021-04-22 DIAGNOSIS — E55.9 VITAMIN D DEFICIENCY: ICD-10-CM

## 2021-04-22 DIAGNOSIS — D72.829 LEUKOCYTOSIS, UNSPECIFIED TYPE: ICD-10-CM

## 2021-04-22 DIAGNOSIS — E53.8 LOW SERUM VITAMIN B12: ICD-10-CM

## 2021-04-22 DIAGNOSIS — Z13.1 SCREENING FOR DIABETES MELLITUS: ICD-10-CM

## 2021-04-22 DIAGNOSIS — Z09 HOSPITAL DISCHARGE FOLLOW-UP: Primary | ICD-10-CM

## 2021-04-22 DIAGNOSIS — R53.83 OTHER FATIGUE: ICD-10-CM

## 2021-04-22 DIAGNOSIS — A08.11 NOROVIRUS: ICD-10-CM

## 2021-04-22 DIAGNOSIS — A41.9 SEPSIS, DUE TO UNSPECIFIED ORGANISM, UNSPECIFIED WHETHER ACUTE ORGAN DYSFUNCTION PRESENT (HCC): Primary | ICD-10-CM

## 2021-04-22 DIAGNOSIS — H61.22 LEFT EAR IMPACTED CERUMEN: ICD-10-CM

## 2021-04-22 DIAGNOSIS — E78.2 MIXED HYPERLIPIDEMIA: ICD-10-CM

## 2021-04-22 LAB
ALBUMIN SERPL-MCNC: 4.2 G/DL (ref 3.5–5)
ALBUMIN/GLOB SERPL: 1.3 G/DL (ref 1.1–1.8)
ALP SERPL-CCNC: 84 U/L (ref 38–126)
ALT SERPL W P-5'-P-CCNC: 46 U/L
ANION GAP SERPL CALCULATED.3IONS-SCNC: 8 MMOL/L (ref 5–15)
AST SERPL-CCNC: 29 U/L (ref 17–59)
BASOPHILS # BLD AUTO: 0.04 10*3/MM3 (ref 0–0.2)
BASOPHILS NFR BLD AUTO: 0.4 % (ref 0–1.5)
BILIRUB SERPL-MCNC: 0.7 MG/DL (ref 0.2–1.3)
BUN SERPL-MCNC: 17 MG/DL (ref 7–23)
BUN/CREAT SERPL: 16.2 (ref 7–25)
CALCIUM SPEC-SCNC: 9.2 MG/DL (ref 8.4–10.2)
CHLORIDE SERPL-SCNC: 104 MMOL/L (ref 101–112)
CHOLEST SERPL-MCNC: 188 MG/DL (ref 150–200)
CO2 SERPL-SCNC: 27 MMOL/L (ref 22–30)
CREAT SERPL-MCNC: 1.05 MG/DL (ref 0.7–1.3)
DEPRECATED RDW RBC AUTO: 40.1 FL (ref 37–54)
EOSINOPHIL # BLD AUTO: 0.13 10*3/MM3 (ref 0–0.4)
EOSINOPHIL NFR BLD AUTO: 1.4 % (ref 0.3–6.2)
ERYTHROCYTE [DISTWIDTH] IN BLOOD BY AUTOMATED COUNT: 12.4 % (ref 12.3–15.4)
GFR SERPL CREATININE-BSD FRML MDRD: 80 ML/MIN/1.73 (ref 70–162)
GLOBULIN UR ELPH-MCNC: 3.2 GM/DL (ref 2.3–3.5)
GLUCOSE SERPL-MCNC: 108 MG/DL (ref 70–99)
HCT VFR BLD AUTO: 40.4 % (ref 37.5–51)
HDLC SERPL-MCNC: 35 MG/DL (ref 40–59)
HGB BLD-MCNC: 14.1 G/DL (ref 13–17.7)
LDLC SERPL CALC-MCNC: 130 MG/DL
LDLC/HDLC SERPL: 3.63 {RATIO} (ref 0–3.55)
LYMPHOCYTES # BLD AUTO: 0.93 10*3/MM3 (ref 0.7–3.1)
LYMPHOCYTES NFR BLD AUTO: 9.8 % (ref 19.6–45.3)
MAGNESIUM SERPL-MCNC: 2.1 MG/DL (ref 1.6–2.3)
MCH RBC QN AUTO: 31.5 PG (ref 26.6–33)
MCHC RBC AUTO-ENTMCNC: 34.9 G/DL (ref 31.5–35.7)
MCV RBC AUTO: 90.4 FL (ref 79–97)
MONOCYTES # BLD AUTO: 1.08 10*3/MM3 (ref 0.1–0.9)
MONOCYTES NFR BLD AUTO: 11.3 % (ref 5–12)
NEUTROPHILS NFR BLD AUTO: 7.35 10*3/MM3 (ref 1.7–7)
NEUTROPHILS NFR BLD AUTO: 77.1 % (ref 42.7–76)
PLATELET # BLD AUTO: 253 10*3/MM3 (ref 140–450)
PMV BLD AUTO: 8.5 FL (ref 6–12)
POTASSIUM SERPL-SCNC: 3.9 MMOL/L (ref 3.4–5)
PROT SERPL-MCNC: 7.4 G/DL (ref 6.3–8.6)
RBC # BLD AUTO: 4.47 10*6/MM3 (ref 4.14–5.8)
SODIUM SERPL-SCNC: 139 MMOL/L (ref 137–145)
TRIGL SERPL-MCNC: 129 MG/DL
VLDLC SERPL-MCNC: 23 MG/DL (ref 5–40)
WBC # BLD AUTO: 9.53 10*3/MM3 (ref 3.4–10.8)

## 2021-04-22 PROCEDURE — 85025 COMPLETE CBC W/AUTO DIFF WBC: CPT | Performed by: NURSE PRACTITIONER

## 2021-04-22 PROCEDURE — 84403 ASSAY OF TOTAL TESTOSTERONE: CPT | Performed by: NURSE PRACTITIONER

## 2021-04-22 PROCEDURE — 80053 COMPREHEN METABOLIC PANEL: CPT | Performed by: NURSE PRACTITIONER

## 2021-04-22 PROCEDURE — 84402 ASSAY OF FREE TESTOSTERONE: CPT | Performed by: NURSE PRACTITIONER

## 2021-04-22 PROCEDURE — 99214 OFFICE O/P EST MOD 30 MIN: CPT | Performed by: NURSE PRACTITIONER

## 2021-04-22 PROCEDURE — 80061 LIPID PANEL: CPT | Performed by: NURSE PRACTITIONER

## 2021-04-22 PROCEDURE — 83735 ASSAY OF MAGNESIUM: CPT | Performed by: NURSE PRACTITIONER

## 2021-04-22 PROCEDURE — 36415 COLL VENOUS BLD VENIPUNCTURE: CPT | Performed by: NURSE PRACTITIONER

## 2021-04-22 PROCEDURE — 82306 VITAMIN D 25 HYDROXY: CPT | Performed by: NURSE PRACTITIONER

## 2021-04-22 PROCEDURE — 82607 VITAMIN B-12: CPT | Performed by: NURSE PRACTITIONER

## 2021-04-22 RX ORDER — PROMETHAZINE HYDROCHLORIDE 25 MG/1
25 TABLET ORAL EVERY 6 HOURS PRN
Qty: 30 TABLET | Refills: 2 | Status: SHIPPED | OUTPATIENT
Start: 2021-04-22

## 2021-04-22 RX ORDER — OMEPRAZOLE 40 MG/1
40 CAPSULE, DELAYED RELEASE ORAL DAILY
Qty: 30 CAPSULE | Refills: 0 | Status: SHIPPED | OUTPATIENT
Start: 2021-04-22

## 2021-04-22 RX ORDER — ONDANSETRON HYDROCHLORIDE 8 MG/1
8 TABLET, FILM COATED ORAL EVERY 8 HOURS PRN
Qty: 30 TABLET | Refills: 2 | Status: SHIPPED | OUTPATIENT
Start: 2021-04-22

## 2021-04-23 LAB
25(OH)D3 SERPL-MCNC: 25.2 NG/ML (ref 30–100)
VIT B12 BLD-MCNC: 342 PG/ML (ref 211–946)

## 2021-04-28 LAB
TESTOST FREE SERPL-MCNC: 5.1 PG/ML (ref 8.7–25.1)
TESTOST SERPL-MCNC: 362 NG/DL (ref 264–916)

## 2021-04-28 NOTE — PROGRESS NOTES
Remind pt he is due to recheck his cbc this week, does not have to be fasting and lab is in.     Testosterone levels are low and shouldn't be for his age, they are not crazy low but lower end of normal. There are a lot of risk factors for starting testosterone like polycythemia, worsening of heart condition if existent, and infertility. It is also controlled substance and requires lots of visits to keep filling it if done by myself as I am a NP vs  But he needs an eval with endo first because I start him on to see if they think this is a good idea and of course endo can usually prescribe more often. Confirm taht he does not want anymore kids because if he doesn't  I dont advise looking into this right now.